# Patient Record
Sex: MALE | Race: WHITE | NOT HISPANIC OR LATINO | Employment: OTHER | URBAN - NONMETROPOLITAN AREA
[De-identification: names, ages, dates, MRNs, and addresses within clinical notes are randomized per-mention and may not be internally consistent; named-entity substitution may affect disease eponyms.]

---

## 2017-01-23 ENCOUNTER — LAB (OUTPATIENT)
Dept: LAB | Facility: OTHER | Age: 72
End: 2017-01-23

## 2017-01-23 ENCOUNTER — TRANSCRIBE ORDERS (OUTPATIENT)
Dept: LAB | Facility: OTHER | Age: 72
End: 2017-01-23

## 2017-01-23 DIAGNOSIS — Z79.01 LONG TERM (CURRENT) USE OF ANTICOAGULANTS: Primary | ICD-10-CM

## 2017-01-23 DIAGNOSIS — Z79.01 LONG TERM (CURRENT) USE OF ANTICOAGULANTS: ICD-10-CM

## 2017-01-23 LAB
INR PPP: 1.23 (ref 0.8–1.2)
PROTHROMBIN TIME: 15.2 SECONDS (ref 11.1–15.3)

## 2017-01-23 PROCEDURE — 36415 COLL VENOUS BLD VENIPUNCTURE: CPT | Performed by: INTERNAL MEDICINE

## 2017-01-23 PROCEDURE — 85610 PROTHROMBIN TIME: CPT | Performed by: INTERNAL MEDICINE

## 2017-03-24 ENCOUNTER — LAB (OUTPATIENT)
Dept: LAB | Facility: OTHER | Age: 72
End: 2017-03-24

## 2017-03-24 DIAGNOSIS — Z79.01 LONG TERM (CURRENT) USE OF ANTICOAGULANTS: ICD-10-CM

## 2017-03-24 LAB
INR PPP: 2.65 (ref 0.8–1.2)
PROTHROMBIN TIME: 27 SECONDS (ref 11.1–15.3)

## 2017-03-24 PROCEDURE — 36415 COLL VENOUS BLD VENIPUNCTURE: CPT | Performed by: INTERNAL MEDICINE

## 2017-03-24 PROCEDURE — 85610 PROTHROMBIN TIME: CPT | Performed by: INTERNAL MEDICINE

## 2017-07-13 ENCOUNTER — LAB (OUTPATIENT)
Dept: LAB | Facility: OTHER | Age: 72
End: 2017-07-13

## 2017-07-13 DIAGNOSIS — Z79.01 LONG TERM (CURRENT) USE OF ANTICOAGULANTS: ICD-10-CM

## 2017-07-13 LAB
INR PPP: 2.49 (ref 0.8–1.2)
PROTHROMBIN TIME: 27.2 SECONDS (ref 11.1–15.3)

## 2017-07-13 PROCEDURE — 85610 PROTHROMBIN TIME: CPT | Performed by: INTERNAL MEDICINE

## 2017-07-13 PROCEDURE — 36415 COLL VENOUS BLD VENIPUNCTURE: CPT | Performed by: INTERNAL MEDICINE

## 2017-11-21 ENCOUNTER — TRANSCRIBE ORDERS (OUTPATIENT)
Dept: GENERAL RADIOLOGY | Facility: CLINIC | Age: 72
End: 2017-11-21

## 2017-11-21 ENCOUNTER — LAB (OUTPATIENT)
Dept: LAB | Facility: OTHER | Age: 72
End: 2017-11-21

## 2017-11-21 DIAGNOSIS — Z79.01 LONG-TERM (CURRENT) USE OF ANTICOAGULANTS: ICD-10-CM

## 2017-11-21 DIAGNOSIS — Z79.01 LONG TERM CURRENT USE OF ANTICOAGULANT THERAPY: ICD-10-CM

## 2017-11-21 DIAGNOSIS — R97.20 ELEVATED PROSTATE SPECIFIC ANTIGEN (PSA): ICD-10-CM

## 2017-11-21 DIAGNOSIS — G89.4 CHRONIC PAIN SYNDROME: ICD-10-CM

## 2017-11-21 DIAGNOSIS — F17.200 SMOKING ADDICTION: ICD-10-CM

## 2017-11-21 DIAGNOSIS — R73.02 IGT (IMPAIRED GLUCOSE TOLERANCE): ICD-10-CM

## 2017-11-21 DIAGNOSIS — I10 ESSENTIAL HYPERTENSION: ICD-10-CM

## 2017-11-21 DIAGNOSIS — I10 ESSENTIAL HYPERTENSION: Primary | ICD-10-CM

## 2017-11-21 LAB
ALBUMIN SERPL-MCNC: 3.6 G/DL (ref 3.2–5.5)
ALBUMIN/GLOB SERPL: 1.2 G/DL (ref 1–3)
ALP SERPL-CCNC: 40 U/L (ref 15–121)
ALT SERPL W P-5'-P-CCNC: 9 U/L (ref 10–60)
ANION GAP SERPL CALCULATED.3IONS-SCNC: 8 MMOL/L (ref 5–15)
AST SERPL-CCNC: 12 U/L (ref 10–60)
BASOPHILS # BLD AUTO: 0.01 10*3/MM3 (ref 0–0.2)
BASOPHILS NFR BLD AUTO: 0.2 % (ref 0–2)
BILIRUB SERPL-MCNC: 1.3 MG/DL (ref 0.2–1)
BUN BLD-MCNC: 18 MG/DL (ref 8–25)
BUN/CREAT SERPL: 18 (ref 7–25)
CALCIUM SPEC-SCNC: 8.8 MG/DL (ref 8.4–10.8)
CHLORIDE SERPL-SCNC: 103 MMOL/L (ref 100–112)
CO2 SERPL-SCNC: 31 MMOL/L (ref 20–32)
CREAT BLD-MCNC: 1 MG/DL (ref 0.4–1.3)
DEPRECATED RDW RBC AUTO: 62.8 FL (ref 35.1–43.9)
EOSINOPHIL # BLD AUTO: 0.11 10*3/MM3 (ref 0–0.7)
EOSINOPHIL NFR BLD AUTO: 1.9 % (ref 0–7)
ERYTHROCYTE [DISTWIDTH] IN BLOOD BY AUTOMATED COUNT: 15.8 % (ref 11.5–14.5)
GFR SERPL CREATININE-BSD FRML MDRD: 74 ML/MIN/1.73 (ref 42–98)
GLOBULIN UR ELPH-MCNC: 3.1 GM/DL (ref 2.5–4.6)
GLUCOSE BLD-MCNC: 95 MG/DL (ref 70–100)
HCT VFR BLD AUTO: 39.2 % (ref 39–49)
HGB BLD-MCNC: 13.1 G/DL (ref 13.7–17.3)
INR PPP: 1.92 (ref 0.8–1.2)
LYMPHOCYTES # BLD AUTO: 1.2 10*3/MM3 (ref 0.6–4.2)
LYMPHOCYTES NFR BLD AUTO: 20.3 % (ref 10–50)
MCH RBC QN AUTO: 36.7 PG (ref 26.5–34)
MCHC RBC AUTO-ENTMCNC: 33.4 G/DL (ref 31.5–36.3)
MCV RBC AUTO: 109.8 FL (ref 80–98)
MONOCYTES # BLD AUTO: 0.61 10*3/MM3 (ref 0–0.9)
MONOCYTES NFR BLD AUTO: 10.3 % (ref 0–12)
NEUTROPHILS # BLD AUTO: 3.99 10*3/MM3 (ref 2–8.6)
NEUTROPHILS NFR BLD AUTO: 67.3 % (ref 37–80)
PLATELET # BLD AUTO: 120 10*3/MM3 (ref 150–450)
PMV BLD AUTO: 11 FL (ref 8–12)
POTASSIUM BLD-SCNC: 4.1 MMOL/L (ref 3.4–5.4)
PROT SERPL-MCNC: 6.7 G/DL (ref 6.7–8.2)
PROTHROMBIN TIME: 22 SECONDS (ref 11.1–15.3)
RBC # BLD AUTO: 3.57 10*6/MM3 (ref 4.37–5.74)
SODIUM BLD-SCNC: 142 MMOL/L (ref 134–146)
WBC NRBC COR # BLD: 5.92 10*3/MM3 (ref 3.2–9.8)

## 2017-11-21 PROCEDURE — 85025 COMPLETE CBC W/AUTO DIFF WBC: CPT | Performed by: INTERNAL MEDICINE

## 2017-11-21 PROCEDURE — 85610 PROTHROMBIN TIME: CPT | Performed by: INTERNAL MEDICINE

## 2017-11-21 PROCEDURE — 36415 COLL VENOUS BLD VENIPUNCTURE: CPT | Performed by: INTERNAL MEDICINE

## 2017-11-21 PROCEDURE — 80053 COMPREHEN METABOLIC PANEL: CPT | Performed by: INTERNAL MEDICINE

## 2017-11-30 ENCOUNTER — LAB (OUTPATIENT)
Dept: LAB | Facility: OTHER | Age: 72
End: 2017-11-30

## 2017-11-30 DIAGNOSIS — R97.20 ELEVATED PROSTATE SPECIFIC ANTIGEN (PSA): ICD-10-CM

## 2017-11-30 DIAGNOSIS — F17.200 SMOKING ADDICTION: ICD-10-CM

## 2017-11-30 DIAGNOSIS — R73.02 IGT (IMPAIRED GLUCOSE TOLERANCE): ICD-10-CM

## 2017-11-30 DIAGNOSIS — Z79.01 LONG-TERM (CURRENT) USE OF ANTICOAGULANTS: ICD-10-CM

## 2017-11-30 DIAGNOSIS — G89.4 CHRONIC PAIN SYNDROME: ICD-10-CM

## 2017-11-30 DIAGNOSIS — I10 ESSENTIAL HYPERTENSION: ICD-10-CM

## 2017-11-30 LAB
INR PPP: 1.33 (ref 0.8–1.2)
PROTHROMBIN TIME: 16.5 SECONDS (ref 11.1–15.3)

## 2017-11-30 PROCEDURE — 85610 PROTHROMBIN TIME: CPT | Performed by: INTERNAL MEDICINE

## 2017-11-30 PROCEDURE — 36415 COLL VENOUS BLD VENIPUNCTURE: CPT | Performed by: INTERNAL MEDICINE

## 2017-12-15 ENCOUNTER — LAB (OUTPATIENT)
Dept: LAB | Facility: OTHER | Age: 72
End: 2017-12-15

## 2017-12-15 DIAGNOSIS — I10 ESSENTIAL HYPERTENSION: ICD-10-CM

## 2017-12-15 DIAGNOSIS — Z79.01 LONG-TERM (CURRENT) USE OF ANTICOAGULANTS: ICD-10-CM

## 2017-12-15 DIAGNOSIS — G89.4 CHRONIC PAIN SYNDROME: ICD-10-CM

## 2017-12-15 DIAGNOSIS — R73.02 IGT (IMPAIRED GLUCOSE TOLERANCE): ICD-10-CM

## 2017-12-15 DIAGNOSIS — R97.20 ELEVATED PROSTATE SPECIFIC ANTIGEN (PSA): ICD-10-CM

## 2017-12-15 DIAGNOSIS — F17.200 SMOKING ADDICTION: ICD-10-CM

## 2017-12-15 LAB
INR PPP: 1.28 (ref 0.8–1.2)
PROTHROMBIN TIME: 16 SECONDS (ref 11.1–15.3)

## 2017-12-15 PROCEDURE — 36415 COLL VENOUS BLD VENIPUNCTURE: CPT | Performed by: INTERNAL MEDICINE

## 2017-12-15 PROCEDURE — 85610 PROTHROMBIN TIME: CPT | Performed by: INTERNAL MEDICINE

## 2017-12-21 ENCOUNTER — LAB (OUTPATIENT)
Dept: LAB | Facility: OTHER | Age: 72
End: 2017-12-21

## 2017-12-21 DIAGNOSIS — I10 ESSENTIAL HYPERTENSION: ICD-10-CM

## 2017-12-21 DIAGNOSIS — Z79.01 LONG-TERM (CURRENT) USE OF ANTICOAGULANTS: ICD-10-CM

## 2017-12-21 DIAGNOSIS — R97.20 ELEVATED PROSTATE SPECIFIC ANTIGEN (PSA): ICD-10-CM

## 2017-12-21 DIAGNOSIS — F17.200 SMOKING ADDICTION: ICD-10-CM

## 2017-12-21 DIAGNOSIS — G89.4 CHRONIC PAIN SYNDROME: ICD-10-CM

## 2017-12-21 DIAGNOSIS — R73.02 IGT (IMPAIRED GLUCOSE TOLERANCE): ICD-10-CM

## 2017-12-21 LAB
INR PPP: 1.58 (ref 0.8–1.2)
PROTHROMBIN TIME: 18.9 SECONDS (ref 11.1–15.3)

## 2017-12-21 PROCEDURE — 36415 COLL VENOUS BLD VENIPUNCTURE: CPT | Performed by: INTERNAL MEDICINE

## 2017-12-21 PROCEDURE — 85610 PROTHROMBIN TIME: CPT | Performed by: INTERNAL MEDICINE

## 2017-12-28 ENCOUNTER — LAB (OUTPATIENT)
Dept: LAB | Facility: OTHER | Age: 72
End: 2017-12-28

## 2017-12-28 DIAGNOSIS — G89.4 CHRONIC PAIN SYNDROME: ICD-10-CM

## 2017-12-28 DIAGNOSIS — R97.20 ELEVATED PROSTATE SPECIFIC ANTIGEN (PSA): ICD-10-CM

## 2017-12-28 DIAGNOSIS — F17.200 SMOKING ADDICTION: ICD-10-CM

## 2017-12-28 DIAGNOSIS — R73.02 IGT (IMPAIRED GLUCOSE TOLERANCE): ICD-10-CM

## 2017-12-28 DIAGNOSIS — Z79.01 LONG-TERM (CURRENT) USE OF ANTICOAGULANTS: ICD-10-CM

## 2017-12-28 DIAGNOSIS — I10 ESSENTIAL HYPERTENSION: ICD-10-CM

## 2017-12-28 LAB
INR PPP: 2.44 (ref 0.8–1.2)
PROTHROMBIN TIME: 26.7 SECONDS (ref 11.1–15.3)

## 2017-12-28 PROCEDURE — 85610 PROTHROMBIN TIME: CPT | Performed by: INTERNAL MEDICINE

## 2017-12-28 PROCEDURE — 36415 COLL VENOUS BLD VENIPUNCTURE: CPT | Performed by: INTERNAL MEDICINE

## 2018-01-04 ENCOUNTER — LAB (OUTPATIENT)
Dept: LAB | Facility: OTHER | Age: 73
End: 2018-01-04

## 2018-01-04 DIAGNOSIS — Z79.01 LONG-TERM (CURRENT) USE OF ANTICOAGULANTS: ICD-10-CM

## 2018-01-04 DIAGNOSIS — F17.200 SMOKING ADDICTION: ICD-10-CM

## 2018-01-04 DIAGNOSIS — G89.4 CHRONIC PAIN SYNDROME: ICD-10-CM

## 2018-01-04 DIAGNOSIS — R73.02 IGT (IMPAIRED GLUCOSE TOLERANCE): ICD-10-CM

## 2018-01-04 DIAGNOSIS — I10 ESSENTIAL HYPERTENSION: ICD-10-CM

## 2018-01-04 DIAGNOSIS — R97.20 ELEVATED PROSTATE SPECIFIC ANTIGEN (PSA): ICD-10-CM

## 2018-01-04 LAB
INR PPP: 1.71 (ref 0.8–1.2)
PROTHROMBIN TIME: 20.2 SECONDS (ref 11.1–15.3)

## 2018-01-04 PROCEDURE — 36415 COLL VENOUS BLD VENIPUNCTURE: CPT | Performed by: INTERNAL MEDICINE

## 2018-01-04 PROCEDURE — 85610 PROTHROMBIN TIME: CPT | Performed by: INTERNAL MEDICINE

## 2018-01-17 ENCOUNTER — LAB (OUTPATIENT)
Dept: LAB | Facility: OTHER | Age: 73
End: 2018-01-17

## 2018-01-17 DIAGNOSIS — Z79.01 LONG-TERM (CURRENT) USE OF ANTICOAGULANTS: ICD-10-CM

## 2018-01-17 DIAGNOSIS — F17.200 SMOKING ADDICTION: ICD-10-CM

## 2018-01-17 DIAGNOSIS — G89.4 CHRONIC PAIN SYNDROME: ICD-10-CM

## 2018-01-17 DIAGNOSIS — R97.20 ELEVATED PROSTATE SPECIFIC ANTIGEN (PSA): ICD-10-CM

## 2018-01-17 DIAGNOSIS — R73.02 IGT (IMPAIRED GLUCOSE TOLERANCE): ICD-10-CM

## 2018-01-17 DIAGNOSIS — I10 ESSENTIAL HYPERTENSION: ICD-10-CM

## 2018-01-17 LAB
INR PPP: 2.1 (ref 0.8–1.2)
PROTHROMBIN TIME: 23.7 SECONDS (ref 11.1–15.3)

## 2018-01-17 PROCEDURE — 36415 COLL VENOUS BLD VENIPUNCTURE: CPT | Performed by: INTERNAL MEDICINE

## 2018-01-17 PROCEDURE — 85610 PROTHROMBIN TIME: CPT | Performed by: INTERNAL MEDICINE

## 2018-03-07 ENCOUNTER — LAB (OUTPATIENT)
Dept: LAB | Facility: OTHER | Age: 73
End: 2018-03-07

## 2018-03-07 DIAGNOSIS — Z79.01 LONG-TERM (CURRENT) USE OF ANTICOAGULANTS: ICD-10-CM

## 2018-03-07 DIAGNOSIS — R97.20 ELEVATED PROSTATE SPECIFIC ANTIGEN (PSA): ICD-10-CM

## 2018-03-07 DIAGNOSIS — G89.4 CHRONIC PAIN SYNDROME: ICD-10-CM

## 2018-03-07 DIAGNOSIS — F17.200 SMOKING ADDICTION: ICD-10-CM

## 2018-03-07 DIAGNOSIS — I10 ESSENTIAL HYPERTENSION: ICD-10-CM

## 2018-03-07 DIAGNOSIS — R73.02 IGT (IMPAIRED GLUCOSE TOLERANCE): ICD-10-CM

## 2018-03-07 LAB
INR PPP: 1.85 (ref 0.8–1.2)
PROTHROMBIN TIME: 21.4 SECONDS (ref 11.1–15.3)

## 2018-03-07 PROCEDURE — 85610 PROTHROMBIN TIME: CPT | Performed by: INTERNAL MEDICINE

## 2018-03-07 PROCEDURE — 36415 COLL VENOUS BLD VENIPUNCTURE: CPT | Performed by: INTERNAL MEDICINE

## 2018-03-27 ENCOUNTER — LAB (OUTPATIENT)
Dept: LAB | Facility: OTHER | Age: 73
End: 2018-03-27

## 2018-03-27 DIAGNOSIS — Z79.01 LONG-TERM (CURRENT) USE OF ANTICOAGULANTS: ICD-10-CM

## 2018-03-27 DIAGNOSIS — F17.200 SMOKING ADDICTION: ICD-10-CM

## 2018-03-27 DIAGNOSIS — I10 ESSENTIAL HYPERTENSION: ICD-10-CM

## 2018-03-27 DIAGNOSIS — R97.20 ELEVATED PROSTATE SPECIFIC ANTIGEN (PSA): ICD-10-CM

## 2018-03-27 DIAGNOSIS — R73.02 IGT (IMPAIRED GLUCOSE TOLERANCE): ICD-10-CM

## 2018-03-27 DIAGNOSIS — G89.4 CHRONIC PAIN SYNDROME: ICD-10-CM

## 2018-03-27 LAB
INR PPP: 6.31 (ref 0.8–1.2)
PROTHROMBIN TIME: 57.2 SECONDS (ref 11.1–15.3)

## 2018-03-27 PROCEDURE — 85610 PROTHROMBIN TIME: CPT | Performed by: INTERNAL MEDICINE

## 2018-04-09 ENCOUNTER — LAB (OUTPATIENT)
Dept: LAB | Facility: OTHER | Age: 73
End: 2018-04-09

## 2018-04-09 DIAGNOSIS — R73.02 IGT (IMPAIRED GLUCOSE TOLERANCE): ICD-10-CM

## 2018-04-09 DIAGNOSIS — F17.200 SMOKING ADDICTION: ICD-10-CM

## 2018-04-09 DIAGNOSIS — Z79.01 LONG-TERM (CURRENT) USE OF ANTICOAGULANTS: ICD-10-CM

## 2018-04-09 DIAGNOSIS — R97.20 ELEVATED PROSTATE SPECIFIC ANTIGEN (PSA): ICD-10-CM

## 2018-04-09 DIAGNOSIS — I10 ESSENTIAL HYPERTENSION: ICD-10-CM

## 2018-04-09 DIAGNOSIS — G89.4 CHRONIC PAIN SYNDROME: ICD-10-CM

## 2018-04-09 LAB
INR PPP: 1.88 (ref 0.8–1.2)
PROTHROMBIN TIME: 21.7 SECONDS (ref 11.1–15.3)

## 2018-04-09 PROCEDURE — 85610 PROTHROMBIN TIME: CPT | Performed by: INTERNAL MEDICINE

## 2018-04-09 PROCEDURE — 36415 COLL VENOUS BLD VENIPUNCTURE: CPT | Performed by: INTERNAL MEDICINE

## 2018-05-21 ENCOUNTER — LAB (OUTPATIENT)
Dept: LAB | Facility: OTHER | Age: 73
End: 2018-05-21

## 2018-05-21 ENCOUNTER — TRANSCRIBE ORDERS (OUTPATIENT)
Dept: GENERAL RADIOLOGY | Facility: CLINIC | Age: 73
End: 2018-05-21

## 2018-05-21 DIAGNOSIS — R97.20 ELEVATED PROSTATE SPECIFIC ANTIGEN (PSA): ICD-10-CM

## 2018-05-21 DIAGNOSIS — R73.02 IGT (IMPAIRED GLUCOSE TOLERANCE): ICD-10-CM

## 2018-05-21 DIAGNOSIS — I10 ESSENTIAL HYPERTENSION, MALIGNANT: ICD-10-CM

## 2018-05-21 DIAGNOSIS — Z79.01 LONG-TERM (CURRENT) USE OF ANTICOAGULANTS: ICD-10-CM

## 2018-05-21 DIAGNOSIS — R73.09 IMPAIRED GLUCOSE TOLERANCE TEST: ICD-10-CM

## 2018-05-21 DIAGNOSIS — G89.4 CHRONIC PAIN SYNDROME: ICD-10-CM

## 2018-05-21 DIAGNOSIS — J34.89 NASAL LESION: ICD-10-CM

## 2018-05-21 DIAGNOSIS — F17.200 SMOKING ADDICTION: ICD-10-CM

## 2018-05-21 DIAGNOSIS — G89.4 CHRONIC PAIN SYNDROME: Primary | ICD-10-CM

## 2018-05-21 DIAGNOSIS — I10 ESSENTIAL HYPERTENSION: ICD-10-CM

## 2018-05-21 LAB
ALBUMIN SERPL-MCNC: 3.8 G/DL (ref 3.2–5.5)
ALBUMIN/GLOB SERPL: 1.2 G/DL (ref 1–3)
ALP SERPL-CCNC: 45 U/L (ref 15–121)
ALT SERPL W P-5'-P-CCNC: 14 U/L (ref 10–60)
ANION GAP SERPL CALCULATED.3IONS-SCNC: 9 MMOL/L (ref 5–15)
ANISOCYTOSIS BLD QL: NORMAL
AST SERPL-CCNC: 20 U/L (ref 10–60)
BILIRUB SERPL-MCNC: 0.7 MG/DL (ref 0.2–1)
BUN BLD-MCNC: 17 MG/DL (ref 8–25)
BUN/CREAT SERPL: 17 (ref 7–25)
CALCIUM SPEC-SCNC: 8.8 MG/DL (ref 8.4–10.8)
CHLORIDE SERPL-SCNC: 102 MMOL/L (ref 100–112)
CHOLEST SERPL-MCNC: 180 MG/DL (ref 150–200)
CO2 SERPL-SCNC: 29 MMOL/L (ref 20–32)
CREAT BLD-MCNC: 1 MG/DL (ref 0.4–1.3)
DEPRECATED RDW RBC AUTO: 64.1 FL (ref 35.1–43.9)
EOSINOPHIL # BLD MANUAL: 0.07 10*3/MM3 (ref 0–0.7)
EOSINOPHIL NFR BLD MANUAL: 1 % (ref 0–7)
ERYTHROCYTE [DISTWIDTH] IN BLOOD BY AUTOMATED COUNT: 16 % (ref 11.5–14.5)
GFR SERPL CREATININE-BSD FRML MDRD: 73 ML/MIN/1.73 (ref 42–98)
GLOBULIN UR ELPH-MCNC: 3.3 GM/DL (ref 2.5–4.6)
GLUCOSE BLD-MCNC: 128 MG/DL (ref 70–100)
HBA1C MFR BLD: 6.1 % (ref 4–5.6)
HCT VFR BLD AUTO: 45.7 % (ref 39–49)
HDLC SERPL-MCNC: 59 MG/DL (ref 35–100)
HGB BLD-MCNC: 14.8 G/DL (ref 13.7–17.3)
INR PPP: 4.1 (ref 0.8–1.2)
LDLC SERPL CALC-MCNC: 98 MG/DL
LDLC/HDLC SERPL: 1.66 {RATIO}
LYMPHOCYTES # BLD MANUAL: 1.62 10*3/MM3 (ref 0.6–4.2)
LYMPHOCYTES NFR BLD MANUAL: 10 % (ref 0–12)
LYMPHOCYTES NFR BLD MANUAL: 22 % (ref 10–50)
MACROCYTES BLD QL SMEAR: NORMAL
MCH RBC QN AUTO: 35.8 PG (ref 26.5–34)
MCHC RBC AUTO-ENTMCNC: 32.4 G/DL (ref 31.5–36.3)
MCV RBC AUTO: 110.7 FL (ref 80–98)
MONOCYTES # BLD AUTO: 0.74 10*3/MM3 (ref 0–0.9)
NEUTROPHILS # BLD AUTO: 4.94 10*3/MM3 (ref 2–8.6)
NEUTROPHILS NFR BLD MANUAL: 65 % (ref 37–80)
NEUTS BAND NFR BLD MANUAL: 2 % (ref 0–5)
PLATELET # BLD AUTO: 158 10*3/MM3 (ref 150–450)
PMV BLD AUTO: 10.7 FL (ref 8–12)
POTASSIUM BLD-SCNC: 4.5 MMOL/L (ref 3.4–5.4)
PROT SERPL-MCNC: 7.1 G/DL (ref 6.7–8.2)
PROTHROMBIN TIME: 40.5 SECONDS (ref 11.1–15.3)
PSA SERPL-MCNC: 0.35 NG/ML (ref 0–4)
RBC # BLD AUTO: 4.13 10*6/MM3 (ref 4.37–5.74)
SCAN SLIDE: NORMAL
SMALL PLATELETS BLD QL SMEAR: ADEQUATE
SODIUM BLD-SCNC: 140 MMOL/L (ref 134–146)
T4 FREE SERPL-MCNC: 0.88 NG/DL (ref 0.78–2.19)
TRIGL SERPL-MCNC: 115 MG/DL (ref 35–160)
TSH SERPL DL<=0.05 MIU/L-ACNC: 0.81 MIU/ML (ref 0.46–4.68)
VLDLC SERPL-MCNC: 23 MG/DL
WBC MORPH BLD: NORMAL
WBC NRBC COR # BLD: 7.38 10*3/MM3 (ref 3.2–9.8)

## 2018-05-21 PROCEDURE — 85610 PROTHROMBIN TIME: CPT | Performed by: INTERNAL MEDICINE

## 2018-05-21 PROCEDURE — 85025 COMPLETE CBC W/AUTO DIFF WBC: CPT | Performed by: INTERNAL MEDICINE

## 2018-05-21 PROCEDURE — 83036 HEMOGLOBIN GLYCOSYLATED A1C: CPT | Performed by: INTERNAL MEDICINE

## 2018-05-21 PROCEDURE — 36415 COLL VENOUS BLD VENIPUNCTURE: CPT | Performed by: INTERNAL MEDICINE

## 2018-05-21 PROCEDURE — 84443 ASSAY THYROID STIM HORMONE: CPT | Performed by: INTERNAL MEDICINE

## 2018-05-21 PROCEDURE — 80061 LIPID PANEL: CPT | Performed by: INTERNAL MEDICINE

## 2018-05-21 PROCEDURE — 84439 ASSAY OF FREE THYROXINE: CPT | Performed by: INTERNAL MEDICINE

## 2018-05-21 PROCEDURE — G0103 PSA SCREENING: HCPCS | Performed by: INTERNAL MEDICINE

## 2018-05-21 PROCEDURE — 80053 COMPREHEN METABOLIC PANEL: CPT | Performed by: INTERNAL MEDICINE

## 2018-07-11 ENCOUNTER — LAB (OUTPATIENT)
Dept: LAB | Facility: OTHER | Age: 73
End: 2018-07-11

## 2018-07-11 DIAGNOSIS — F17.200 SMOKING ADDICTION: ICD-10-CM

## 2018-07-11 DIAGNOSIS — R73.02 IGT (IMPAIRED GLUCOSE TOLERANCE): ICD-10-CM

## 2018-07-11 DIAGNOSIS — Z79.01 LONG-TERM (CURRENT) USE OF ANTICOAGULANTS: ICD-10-CM

## 2018-07-11 DIAGNOSIS — G89.4 CHRONIC PAIN SYNDROME: ICD-10-CM

## 2018-07-11 DIAGNOSIS — I10 ESSENTIAL HYPERTENSION: ICD-10-CM

## 2018-07-11 DIAGNOSIS — R97.20 ELEVATED PROSTATE SPECIFIC ANTIGEN (PSA): ICD-10-CM

## 2018-07-11 LAB
INR PPP: 1.8 (ref 0.8–1.2)
PROTHROMBIN TIME: 20.2 SECONDS (ref 11.1–15.3)

## 2018-07-11 PROCEDURE — 85610 PROTHROMBIN TIME: CPT | Performed by: INTERNAL MEDICINE

## 2018-07-11 PROCEDURE — 36415 COLL VENOUS BLD VENIPUNCTURE: CPT | Performed by: INTERNAL MEDICINE

## 2018-09-18 ENCOUNTER — LAB (OUTPATIENT)
Dept: LAB | Facility: OTHER | Age: 73
End: 2018-09-18

## 2018-09-18 ENCOUNTER — OFFICE VISIT (OUTPATIENT)
Dept: FAMILY MEDICINE CLINIC | Facility: CLINIC | Age: 73
End: 2018-09-18

## 2018-09-18 VITALS
WEIGHT: 250 LBS | BODY MASS INDEX: 33.13 KG/M2 | DIASTOLIC BLOOD PRESSURE: 70 MMHG | HEIGHT: 73 IN | TEMPERATURE: 96.7 F | OXYGEN SATURATION: 95 % | RESPIRATION RATE: 14 BRPM | SYSTOLIC BLOOD PRESSURE: 110 MMHG | HEART RATE: 98 BPM

## 2018-09-18 DIAGNOSIS — J44.9 COPD (CHRONIC OBSTRUCTIVE PULMONARY DISEASE) WITH CHRONIC BRONCHITIS (HCC): ICD-10-CM

## 2018-09-18 DIAGNOSIS — Z12.5 PROSTATE CANCER SCREENING: ICD-10-CM

## 2018-09-18 DIAGNOSIS — F17.200 SMOKING ADDICTION: ICD-10-CM

## 2018-09-18 DIAGNOSIS — I10 ESSENTIAL HYPERTENSION: ICD-10-CM

## 2018-09-18 DIAGNOSIS — G89.4 CHRONIC PAIN DISORDER: ICD-10-CM

## 2018-09-18 DIAGNOSIS — Z79.01 ENCOUNTER FOR CURRENT LONG-TERM USE OF ANTICOAGULANTS: Primary | ICD-10-CM

## 2018-09-18 DIAGNOSIS — Z87.898 HISTORY OF PREDIABETES: ICD-10-CM

## 2018-09-18 DIAGNOSIS — R97.20 ELEVATED PROSTATE SPECIFIC ANTIGEN (PSA): ICD-10-CM

## 2018-09-18 DIAGNOSIS — G89.29 CHRONIC LEFT SHOULDER PAIN: ICD-10-CM

## 2018-09-18 DIAGNOSIS — E78.2 MIXED HYPERLIPIDEMIA: ICD-10-CM

## 2018-09-18 DIAGNOSIS — G89.29 CHRONIC BILATERAL LOW BACK PAIN WITHOUT SCIATICA: ICD-10-CM

## 2018-09-18 DIAGNOSIS — Z79.899 HIGH RISK MEDICATIONS (NOT ANTICOAGULANTS) LONG-TERM USE: ICD-10-CM

## 2018-09-18 DIAGNOSIS — Z79.83 LONG TERM USE OF BISPHOSPHONATES: ICD-10-CM

## 2018-09-18 DIAGNOSIS — R79.89 ABNORMAL CBC: ICD-10-CM

## 2018-09-18 DIAGNOSIS — I48.20 ATRIAL FIBRILLATION, CHRONIC (HCC): ICD-10-CM

## 2018-09-18 DIAGNOSIS — G63 POLYNEUROPATHY ASSOCIATED WITH UNDERLYING DISEASE (HCC): ICD-10-CM

## 2018-09-18 DIAGNOSIS — R73.02 IGT (IMPAIRED GLUCOSE TOLERANCE): ICD-10-CM

## 2018-09-18 DIAGNOSIS — G89.4 CHRONIC PAIN SYNDROME: ICD-10-CM

## 2018-09-18 DIAGNOSIS — M54.50 CHRONIC BILATERAL LOW BACK PAIN WITHOUT SCIATICA: ICD-10-CM

## 2018-09-18 DIAGNOSIS — M25.512 CHRONIC LEFT SHOULDER PAIN: ICD-10-CM

## 2018-09-18 DIAGNOSIS — Z79.01 LONG TERM CURRENT USE OF ANTICOAGULANT THERAPY: ICD-10-CM

## 2018-09-18 DIAGNOSIS — Z79.01 LONG-TERM (CURRENT) USE OF ANTICOAGULANTS: ICD-10-CM

## 2018-09-18 PROBLEM — J34.89 NASAL LESION: Status: ACTIVE | Noted: 2018-04-26

## 2018-09-18 PROBLEM — S09.90XA HEAD INJURY: Status: ACTIVE | Noted: 2017-10-24

## 2018-09-18 PROBLEM — S09.90XA HEAD INJURY: Status: RESOLVED | Noted: 2017-10-24 | Resolved: 2018-09-18

## 2018-09-18 PROBLEM — J34.89 NASAL LESION: Status: RESOLVED | Noted: 2018-04-26 | Resolved: 2018-09-18

## 2018-09-18 LAB
ALBUMIN SERPL-MCNC: 4 G/DL (ref 3.5–5)
ALBUMIN/GLOB SERPL: 1.1 G/DL (ref 1.1–1.8)
ALP SERPL-CCNC: 59 U/L (ref 38–126)
ALT SERPL W P-5'-P-CCNC: 12 U/L
ANION GAP SERPL CALCULATED.3IONS-SCNC: 7 MMOL/L (ref 5–15)
AST SERPL-CCNC: 15 U/L (ref 17–59)
BILIRUB SERPL-MCNC: 0.9 MG/DL (ref 0.2–1.3)
BUN BLD-MCNC: 22 MG/DL (ref 9–20)
BUN/CREAT SERPL: 17.5 (ref 7–25)
CALCIUM SPEC-SCNC: 9.4 MG/DL (ref 8.4–10.2)
CHLORIDE SERPL-SCNC: 104 MMOL/L (ref 98–107)
CHOLEST SERPL-MCNC: 189 MG/DL (ref 150–200)
CO2 SERPL-SCNC: 28 MMOL/L (ref 22–30)
CREAT BLD-MCNC: 1.26 MG/DL (ref 0.66–1.25)
DEPRECATED RDW RBC AUTO: 62.9 FL (ref 35.1–43.9)
DIGOXIN SERPL-MCNC: 0.4 NG/ML (ref 0.8–2)
EOSINOPHIL # BLD MANUAL: 0.15 10*3/MM3 (ref 0–0.7)
EOSINOPHIL NFR BLD MANUAL: 2 % (ref 0–7)
ERYTHROCYTE [DISTWIDTH] IN BLOOD BY AUTOMATED COUNT: 16 % (ref 11.5–14.5)
GFR SERPL CREATININE-BSD FRML MDRD: 56 ML/MIN/1.73 (ref 42–98)
GLOBULIN UR ELPH-MCNC: 3.7 GM/DL (ref 2.3–3.5)
GLUCOSE BLD-MCNC: 150 MG/DL (ref 74–99)
HBA1C MFR BLD: 6 % (ref 4–5.6)
HCT VFR BLD AUTO: 50.8 % (ref 39–49)
HDLC SERPL-MCNC: 64 MG/DL (ref 40–59)
HGB BLD-MCNC: 16.9 G/DL (ref 13.7–17.3)
INR PPP: 2.24 (ref 0.8–1.2)
LDLC SERPL CALC-MCNC: 105 MG/DL
LDLC/HDLC SERPL: 1.63 {RATIO} (ref 0–3.55)
LYMPHOCYTES # BLD MANUAL: 1.63 10*3/MM3 (ref 0.6–4.2)
LYMPHOCYTES NFR BLD MANUAL: 22 % (ref 10–50)
LYMPHOCYTES NFR BLD MANUAL: 5 % (ref 0–12)
MACROCYTES BLD QL SMEAR: NORMAL
MAGNESIUM SERPL-MCNC: 2 MG/DL (ref 1.6–2.3)
MCH RBC QN AUTO: 36 PG (ref 26.5–34)
MCHC RBC AUTO-ENTMCNC: 33.3 G/DL (ref 31.5–36.3)
MCV RBC AUTO: 108.3 FL (ref 80–98)
MONOCYTES # BLD AUTO: 0.37 10*3/MM3 (ref 0–0.9)
NEUTROPHILS # BLD AUTO: 5.05 10*3/MM3 (ref 2–8.6)
NEUTROPHILS NFR BLD MANUAL: 68 % (ref 37–80)
PLAT MORPH BLD: NORMAL
PLATELET # BLD AUTO: 176 10*3/MM3 (ref 150–450)
PMV BLD AUTO: 10.4 FL (ref 8–12)
POTASSIUM BLD-SCNC: 4.7 MMOL/L (ref 3.4–5)
PROT SERPL-MCNC: 7.7 G/DL (ref 6.3–8.2)
PROTHROMBIN TIME: 23.8 SECONDS (ref 11.1–15.3)
RBC # BLD AUTO: 4.69 10*6/MM3 (ref 4.37–5.74)
SCAN SLIDE: NORMAL
SODIUM BLD-SCNC: 139 MMOL/L (ref 137–145)
TRIGL SERPL-MCNC: 102 MG/DL
VARIANT LYMPHS NFR BLD MANUAL: 3 % (ref 0–5)
VLDLC SERPL-MCNC: 20.4 MG/DL
WBC MORPH BLD: NORMAL
WBC NRBC COR # BLD: 7.42 10*3/MM3 (ref 3.2–9.8)

## 2018-09-18 PROCEDURE — 85025 COMPLETE CBC W/AUTO DIFF WBC: CPT | Performed by: INTERNAL MEDICINE

## 2018-09-18 PROCEDURE — 80053 COMPREHEN METABOLIC PANEL: CPT | Performed by: INTERNAL MEDICINE

## 2018-09-18 PROCEDURE — 85610 PROTHROMBIN TIME: CPT | Performed by: INTERNAL MEDICINE

## 2018-09-18 PROCEDURE — 83036 HEMOGLOBIN GLYCOSYLATED A1C: CPT | Performed by: NURSE PRACTITIONER

## 2018-09-18 PROCEDURE — 99204 OFFICE O/P NEW MOD 45 MIN: CPT | Performed by: NURSE PRACTITIONER

## 2018-09-18 PROCEDURE — 80162 ASSAY OF DIGOXIN TOTAL: CPT | Performed by: NURSE PRACTITIONER

## 2018-09-18 PROCEDURE — 36415 COLL VENOUS BLD VENIPUNCTURE: CPT | Performed by: INTERNAL MEDICINE

## 2018-09-18 PROCEDURE — 83735 ASSAY OF MAGNESIUM: CPT | Performed by: INTERNAL MEDICINE

## 2018-09-18 PROCEDURE — 80061 LIPID PANEL: CPT | Performed by: INTERNAL MEDICINE

## 2018-09-18 RX ORDER — ENALAPRIL MALEATE 5 MG/1
TABLET ORAL
Qty: 90 TABLET | Refills: 1 | Status: SHIPPED | OUTPATIENT
Start: 2018-09-18 | End: 2020-01-09

## 2018-09-18 RX ORDER — WARFARIN SODIUM 5 MG/1
TABLET ORAL
COMMUNITY
Start: 2018-03-07 | End: 2020-01-09

## 2018-09-18 RX ORDER — ATORVASTATIN CALCIUM 40 MG/1
40 TABLET, FILM COATED ORAL
COMMUNITY
Start: 2018-05-31 | End: 2018-09-18 | Stop reason: SDUPTHER

## 2018-09-18 RX ORDER — ENALAPRIL MALEATE 5 MG/1
TABLET ORAL
COMMUNITY
Start: 2018-07-11 | End: 2018-09-18 | Stop reason: SDUPTHER

## 2018-09-18 RX ORDER — VENLAFAXINE 75 MG/1
75 TABLET ORAL
Qty: 90 TABLET | Refills: 1 | Status: SHIPPED | OUTPATIENT
Start: 2018-09-18 | End: 2020-01-09

## 2018-09-18 RX ORDER — METOPROLOL SUCCINATE 50 MG/1
50 TABLET, EXTENDED RELEASE ORAL DAILY
Qty: 90 TABLET | Refills: 1 | Status: SHIPPED | OUTPATIENT
Start: 2018-09-18 | End: 2020-01-09

## 2018-09-18 RX ORDER — DIGOXIN 125 MCG
125 TABLET ORAL DAILY
Qty: 90 TABLET | Refills: 1 | Status: SHIPPED | OUTPATIENT
Start: 2018-09-18 | End: 2020-01-09

## 2018-09-18 RX ORDER — MECLIZINE HYDROCHLORIDE 25 MG/1
TABLET ORAL
Refills: 5 | COMMUNITY
Start: 2018-09-13 | End: 2020-01-09

## 2018-09-18 RX ORDER — VENLAFAXINE 75 MG/1
TABLET ORAL
COMMUNITY
Start: 2018-03-07 | End: 2018-09-18 | Stop reason: SDUPTHER

## 2018-09-18 RX ORDER — METOPROLOL SUCCINATE 50 MG/1
TABLET, EXTENDED RELEASE ORAL
COMMUNITY
Start: 2018-06-20 | End: 2018-09-18 | Stop reason: SDUPTHER

## 2018-09-18 RX ORDER — DIGOXIN 125 MCG
TABLET ORAL
COMMUNITY
Start: 2018-06-11 | End: 2018-09-18 | Stop reason: SDUPTHER

## 2018-09-18 RX ORDER — WARFARIN SODIUM 1 MG/1
TABLET ORAL
COMMUNITY
Start: 2018-04-27 | End: 2019-04-28

## 2018-09-18 RX ORDER — ATORVASTATIN CALCIUM 40 MG/1
40 TABLET, FILM COATED ORAL DAILY
Qty: 90 TABLET | Refills: 1 | Status: SHIPPED | OUTPATIENT
Start: 2018-09-18 | End: 2020-01-09

## 2018-09-18 NOTE — PROGRESS NOTES
Subjective   Isamar Rodriguez is a 72 y.o. male.     Patient presents with multiple chronic problems to establish care. States has a long history of being on Lortab for neck and back pain but stopped them and feels better without them. He comes from the care of Dr Jeffery Bird.  He has chronic atrial fibrillation, onset approx 20 years ago. He has been seen by Dr Benoit in the past for his chronic atrial fibrillation, for which he takes warfarin. This was managed by Dr Bird previously, and he wants us to assume this for him as well. He presents with stable anxiety, managed on medicine. He has stable hypertension and is a prediabetic. 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 This also resulted in chronic lower back pain without radiation. He is a daily smoker, who is not interested in stopping at this time. He uses an albuterol inhaler rarely if needed for SOB.       Anxiety   Presents for initial visit. Onset was more than 5 years ago (2010 when his wife passed away). The problem has been gradually improving. Symptoms include nervous/anxious behavior (hx of anxiety states is controlled with meds satisfactorily). Patient reports no chest pain, compulsions, confusion, decreased concentration, depressed mood, dizziness, dry mouth, excessive worry, feeling of choking, hyperventilation, impotence, insomnia, irritability, malaise, muscle tension, nausea, obsessions, palpitations, panic, restlessness, shortness of breath or suicidal ideas. Symptoms occur rarely. The severity of symptoms is mild. The symptoms are aggravated by family issues (began with death of wife in 2010). The quality of sleep is good. Nighttime awakenings: occasional.     Risk factors include change in medication (recently went cold-turkey off lortab). His past medical history is significant for  anxiety/panic attacks, arrhythmia and chronic lung disease. There is no history of anemia, asthma, bipolar disorder, CAD, CHF, depression, fibromyalgia, hyperthyroidism or suicide attempts. Past treatments include non-SSRI antidepressants and lifestyle changes. The treatment provided significant relief. Compliance with prior treatments has been good. Past compliance problems: none.   Neck Pain    This is a chronic problem. The current episode started more than 1 year ago. The problem occurs intermittently. The problem has been resolved (generally the quality of pain and its imposition on his life has resolved to a limited issue). The pain is associated with a remote injury (mining injury in the 1980s involving head/neck injury). The pain is present in the midline. The quality of the pain is described as aching. The pain is at a severity of 2/10. The pain is mild. 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 The pain is same all the time. Stiffness is present in the morning. Pertinent negatives include no chest pain, fever, headaches, numbness, photophobia, trouble swallowing or weakness. Associated symptoms comments: None. There is inherent stiffness of the neck as expected post cervical fusion.. He has tried acetaminophen, heat, home exercises, ice, NSAIDs and oral narcotics (no longer on opioids for this or any other pain) for the symptoms. The treatment provided significant relief.   Hypertension   This is a chronic problem. The current episode started more than 1 year ago. The problem is unchanged. The problem is controlled. Associated symptoms include neck pain (mild). Pertinent negatives include no blurred vision, chest pain, headaches, malaise/fatigue, palpitations, peripheral edema, PND or shortness of breath. There are no associated agents to hypertension. Risk factors for coronary artery disease include diabetes mellitus,  dyslipidemia, male gender, obesity, sedentary lifestyle, smoking/tobacco exposure and stress. Past treatments include ACE inhibitors and beta blockers. Current antihypertension treatment includes ACE inhibitors and beta blockers. The current treatment provides significant improvement. There are no compliance problems.  Sleep apnea: has not been tested for sleep apnea. Thyroid problem: last thyroid screening was negative.   COPD   This is a chronic (pt reports doesnt know if he has ever had pulmonary function testing.) problem. The current episode started more than 1 year ago. The problem occurs rarely. The problem has been unchanged. Associated symptoms include arthralgias (lower extremities and shoulders at times), congestion (occassionally to rarely), coughing (occassionally to rarely, relieved with albuterol inhaler use.) and neck pain (mild). Pertinent negatives include no abdominal pain, chest pain, chills, diaphoresis, fatigue, fever, headaches, nausea, numbness, rash, sore throat, vomiting or weakness. Treatments tried: albuterol inhaler prn. The treatment provided significant relief.   Back Pain   This is a chronic problem. The current episode started more than 1 year ago. The problem occurs intermittently. The problem is unchanged. The pain is present in the lumbar spine. The quality of the pain is described as aching. The pain does not radiate. The pain is at a severity of 2/10. The pain is mild. The pain is the same all the time. The symptoms are aggravated by bending and twisting. Stiffness is present in the morning. Pertinent negatives include no abdominal pain, chest pain, dysuria, fever, headaches, numbness or weakness. (None) Risk factors include lack of exercise, obesity and sedentary lifestyle. He has tried analgesics, heat, ice and NSAIDs for the symptoms. The treatment provided moderate relief.        The following portions of the patient's history were reviewed and updated as appropriate:  allergies, current medications, past family history, past medical history, past social history, past surgical history and problem list.    Review of Systems   Constitutional: Negative.  Negative for activity change, appetite change, chills, diaphoresis, fatigue, fever, irritability, malaise/fatigue and unexpected weight change.   HENT: Positive for congestion (occassionally to rarely). Negative for dental problem, drooling, ear discharge, ear pain, facial swelling, hearing loss, mouth sores, nosebleeds, postnasal drip, rhinorrhea, sinus pain, sinus pressure, sneezing, sore throat, tinnitus, trouble swallowing and voice change.    Eyes: Negative.  Negative for blurred vision, photophobia, pain, discharge, redness, itching and visual disturbance.   Respiratory: Positive for cough (occassionally to rarely, relieved with albuterol inhaler use.). Negative for apnea, choking, chest tightness, shortness of breath, wheezing and stridor.    Cardiovascular: Positive for leg swelling (has had leg swelling in the past, states it subsided after he quit taking Lyrica.). Negative for chest pain, palpitations and PND.   Gastrointestinal: Negative for abdominal distention, abdominal pain, anal bleeding, blood in stool, constipation, diarrhea, nausea, rectal pain and vomiting.   Endocrine: Negative.  Negative for cold intolerance, heat intolerance, polydipsia, polyphagia and polyuria.        Hx of prediabetic glucose and A1C levels, last one was around 6.1% in May or so.    Genitourinary: Negative for decreased urine volume, difficulty urinating, discharge, dysuria, enuresis, flank pain, frequency, genital sores, hematuria, impotence, penile pain, penile swelling, scrotal swelling, testicular pain and urgency.        History of elevated PSA   Musculoskeletal: Positive for arthralgias (lower extremities and shoulders at times), back pain, neck pain (mild) and neck stiffness (since cervical fusion remote years ago).   Skin: Positive  for color change (bilateral lower legs are darkly discolored (brown)). Negative for pallor, rash and wound.   Allergic/Immunologic: Negative.  Negative for environmental allergies, food allergies and immunocompromised state.   Neurological: Negative.  Negative for dizziness, tremors, seizures, syncope, facial asymmetry, speech difficulty, weakness, light-headedness, numbness and headaches.   Hematological: Negative.  Negative for adenopathy. Does not bruise/bleed easily.   Psychiatric/Behavioral: Negative for agitation, behavioral problems, confusion, decreased concentration, dysphoric mood, hallucinations, self-injury, sleep disturbance and suicidal ideas. The patient is nervous/anxious (hx of anxiety states is controlled with meds satisfactorily). The patient does not have insomnia and is not hyperactive.        Objective   Physical Exam   Constitutional: He is oriented to person, place, and time. He appears well-developed and well-nourished. No distress.   HENT:   Head: Normocephalic and atraumatic.   Right Ear: External ear normal.   Left Ear: External ear normal.   Nose: Nose normal.   Mouth/Throat: Oropharynx is clear and moist. No oropharyngeal exudate.   Eyes: Pupils are equal, round, and reactive to light. Conjunctivae and EOM are normal. Right eye exhibits no discharge. Left eye exhibits no discharge. No scleral icterus.   Neck: Normal range of motion. Neck supple. No JVD present. No tracheal deviation present. No thyromegaly present.   Cardiovascular: Normal rate, regular rhythm, normal heart sounds and intact distal pulses.  Exam reveals no gallop and no friction rub.    No murmur heard.  Pulmonary/Chest: Effort normal and breath sounds normal. No stridor. No respiratory distress. He has no wheezes. He has no rales. He exhibits no tenderness.   Abdominal: Soft. Bowel sounds are normal. He exhibits no distension and no mass. There is no tenderness. There is no rebound and no guarding. No hernia.    Genitourinary:   Genitourinary Comments: deferred   Musculoskeletal: He exhibits no edema, tenderness or deformity.   Lymphadenopathy:     He has no cervical adenopathy.   Neurological: He is alert and oriented to person, place, and time. He displays normal reflexes. No cranial nerve deficit or sensory deficit. He exhibits normal muscle tone. Coordination normal.   Skin: Skin is warm and dry. Capillary refill takes less than 2 seconds. No rash noted. He is not diaphoretic. No erythema. No pallor.   Psychiatric: He has a normal mood and affect. His behavior is normal. Judgment and thought content normal.   Nursing note and vitals reviewed.      Assessment/Plan   Isamar was seen today for establish care.    Diagnoses and all orders for this visit:    Encounter for current long-term use of anticoagulants  -     Cancel: Protime-INR, Fingerstick; Future  -     Cancel: Protime-INR; Future  -     Protime-INR    History of prediabetes  -     Hemoglobin A1c; Future    Essential hypertension  -     Comprehensive Metabolic Panel; Future  -     CBC & Differential; Future  -     Lipid Panel With LDL / HDL Ratio; Future  -     Magnesium; Future    Mixed hyperlipidemia  -     Comprehensive Metabolic Panel; Future  -     CBC & Differential; Future  -     Lipid Panel With LDL / HDL Ratio; Future  -     Magnesium; Future  -     Cancel: Lipid Panel; Future    Chronic bilateral low back pain without sciatica    Chronic left shoulder pain    Polyneuropathy associated with underlying disease (CMS/HCC)    Chronic pain disorder    Elevated prostate specific antigen (PSA)    Long term current use of anticoagulant therapy    Prostate cancer screening    Smoking addiction    Atrial fibrillation, chronic (CMS/HCC)    COPD (chronic obstructive pulmonary disease) with chronic bronchitis (CMS/HCC)    Long term use of bisphosphonates    High risk medications (not anticoagulants) long-term use  -     Digoxin level; Future    Other orders  -      atorvastatin (LIPITOR) 40 MG tablet; Take 1 tablet by mouth Daily.  -     digoxin (DIGOX) 125 MCG tablet; Take 1 tablet by mouth Daily.  -     enalapril (VASOTEC) 5 MG tablet; Take 1 tablet every day  -     metoprolol succinate XL (TOPROL-XL) 50 MG 24 hr tablet; Take 1 tablet by mouth Daily.  -     PROAIR  (90 Base) MCG/ACT inhaler; Inhale 2 puffs Every 4 (Four) Hours As Needed for Wheezing or Shortness of Air.  -     venlafaxine (EFFEXOR) 75 MG tablet; Take 1 tablet by mouth Daily With Breakfast.        Return in about 3 months (around 12/18/2018) for Next scheduled follow up.   Patient Instructions   Will need a repeat Protime/ INR at interval determined after review of todays result, but at least monthly for warfarin dosing safety.   It was a pleasure to meet you!!              This document has been electronically signed by BOSTON Marie on September 18, 2018 3:04 PM

## 2018-09-18 NOTE — PATIENT INSTRUCTIONS
Will need a repeat Protime/ INR at interval determined after review of todays result, but at least monthly for warfarin dosing safety.   It was a pleasure to meet you!!

## 2018-10-02 ENCOUNTER — OFFICE VISIT (OUTPATIENT)
Dept: FAMILY MEDICINE CLINIC | Facility: CLINIC | Age: 73
End: 2018-10-02

## 2018-10-02 VITALS
HEART RATE: 96 BPM | HEIGHT: 73 IN | BODY MASS INDEX: 33.13 KG/M2 | WEIGHT: 250 LBS | RESPIRATION RATE: 14 BRPM | SYSTOLIC BLOOD PRESSURE: 130 MMHG | DIASTOLIC BLOOD PRESSURE: 62 MMHG | OXYGEN SATURATION: 97 %

## 2018-10-02 DIAGNOSIS — Z79.899 LONG TERM PRESCRIPTION BENZODIAZEPINE USE: Chronic | ICD-10-CM

## 2018-10-02 DIAGNOSIS — G47.00 INSOMNIA, UNSPECIFIED TYPE: Primary | Chronic | ICD-10-CM

## 2018-10-02 LAB
AMPHET+METHAMPHET UR QL: NEGATIVE
BARBITURATES UR QL SCN: NEGATIVE
BENZODIAZ UR QL SCN: NEGATIVE
CANNABINOIDS SERPL QL: NEGATIVE
COCAINE UR QL: NEGATIVE
METHADONE UR QL SCN: NEGATIVE
OPIATES UR QL: NEGATIVE
OXYCODONE UR QL SCN: NEGATIVE

## 2018-10-02 PROCEDURE — 80307 DRUG TEST PRSMV CHEM ANLYZR: CPT | Performed by: NURSE PRACTITIONER

## 2018-10-02 PROCEDURE — 99203 OFFICE O/P NEW LOW 30 MIN: CPT | Performed by: NURSE PRACTITIONER

## 2018-10-02 RX ORDER — ZOLPIDEM TARTRATE 10 MG/1
10 TABLET ORAL NIGHTLY PRN
Qty: 30 TABLET | Refills: 5 | Status: SHIPPED | OUTPATIENT
Start: 2018-10-02

## 2018-10-02 RX ORDER — ZOLPIDEM TARTRATE 10 MG/1
10 TABLET ORAL DAILY
Qty: 30 TABLET | Refills: 3 | OUTPATIENT
Start: 2018-10-02

## 2018-10-02 NOTE — TELEPHONE ENCOUNTER
Required patient visit for insomnia and Rx for ambien with ROBERT and ABDULKADIR and contract, this was completed before 4:25pm today. [email or web address]

## 2018-10-02 NOTE — PROGRESS NOTES
Subjective   Isamar Rodriguez is a 72 y.o. male.     Patient presents with new complaint of chronic insomnia. Didn't mention at last visit because he wasn't due for a refill yet. Has had insomnia for over half his life, long term use of ambien.       Insomnia   This is a chronic problem. The current episode started more than 1 year ago. The problem occurs daily. The problem has been unchanged. Pertinent negatives include no chest pain, coughing, fever or rash. Nothing aggravates the symptoms. Treatments tried: ambien. The treatment provided significant relief.        The following portions of the patient's history were reviewed and updated as appropriate: allergies, current medications, past family history, past medical history, past social history, past surgical history and problem list.    Review of Systems   Constitutional: Negative.  Negative for fever and unexpected weight change.   HENT: Negative.    Eyes: Negative.    Respiratory: Negative.  Negative for cough, chest tightness and shortness of breath.    Cardiovascular: Negative.  Negative for chest pain.   Gastrointestinal: Negative.    Endocrine: Negative.    Genitourinary: Negative.  Negative for dysuria.   Musculoskeletal: Negative.    Skin: Negative.  Negative for color change, pallor, rash and wound.   Allergic/Immunologic: Negative.    Neurological: Negative.    Hematological: Negative.    Psychiatric/Behavioral: Negative for sleep disturbance and suicidal ideas. The patient has insomnia.        Objective   Physical Exam   Constitutional: He is oriented to person, place, and time. He appears well-developed and well-nourished.   HENT:   Head: Normocephalic and atraumatic.   Eyes: Pupils are equal, round, and reactive to light. Conjunctivae are normal.   Neck: Normal range of motion. Neck supple.   Cardiovascular: Normal rate, regular rhythm, normal heart sounds and intact distal pulses.  Exam reveals no gallop and no friction rub.    No murmur  heard.  Pulmonary/Chest: Effort normal and breath sounds normal. No respiratory distress. He has no wheezes. He has no rales. He exhibits no tenderness.   Abdominal: Soft. Bowel sounds are normal.   Musculoskeletal: Normal range of motion. He exhibits no edema, tenderness or deformity.   Lymphadenopathy:     He has no cervical adenopathy.   Neurological: He is alert and oriented to person, place, and time.   Skin: Skin is warm and dry. Capillary refill takes 2 to 3 seconds. No erythema. No pallor.   Psychiatric: He has a normal mood and affect. His behavior is normal. Judgment and thought content normal.   Nursing note and vitals reviewed.      Assessment/Plan   Isamar was seen today for follow-up.    Diagnoses and all orders for this visit:    Insomnia, unspecified type  Comments:  new problem for my management, chronic problem, usually managed with ambien, needs refills. will need a ABDULKADIR report and UDS today.  Orders:  -     Urine Drug Screen - Urine, Clean Catch    Long term prescription benzodiazepine use  -     Urine Drug Screen - Urine, Clean Catch    Other orders  -     zolpidem (AMBIEN) 10 MG tablet; Take 1 tablet by mouth At Night As Needed for Sleep.    Patient understands the risks associated with this controlled medication, including tolerance and addiction.  Patient also agrees to only obtain this medication from me, and not from a another provider, unless that provider is covering for me in my absence.  Patient also agrees to be compliant in dosing, and not self adjust the dose of medication.  A signed controlled substance agreement is on file, and the patient has received a controlled substance education sheet at this a previous visit.  The patient has also signed a consent for treatment with a controlled substance as per [facility] policy. ABDULKADIR was obtained.  Return in about 1 month (around 11/2/2018).   There are no Patient Instructions on file for this visit.     Late entry 10/4/18 6pm- pt  will need a specific UDS in future, such as [id number] MANFRED-DIS  ([id number]) as the standard UDS does not specify for ambien. Lab note written in error a few minutes ago stating he needs a gabapentin add on lab level-  This should have been the Toxassure as listed for ambien, not gabapentin. Emili Humphries      This document has been electronically signed by BOSTON Marie on October 4, 2018 6:03 PM

## 2018-10-24 ENCOUNTER — LAB (OUTPATIENT)
Dept: LAB | Facility: OTHER | Age: 73
End: 2018-10-24

## 2018-10-24 ENCOUNTER — TRANSCRIBE ORDERS (OUTPATIENT)
Dept: LAB | Facility: OTHER | Age: 73
End: 2018-10-24

## 2018-10-24 DIAGNOSIS — E11.9 TYPE 2 DIABETES MELLITUS WITHOUT COMPLICATION, UNSPECIFIED WHETHER LONG TERM INSULIN USE (HCC): ICD-10-CM

## 2018-10-24 DIAGNOSIS — I48.91 ATRIAL FIBRILLATION, UNSPECIFIED TYPE (HCC): Primary | ICD-10-CM

## 2018-10-24 DIAGNOSIS — I48.91 ATRIAL FIBRILLATION, UNSPECIFIED TYPE (HCC): ICD-10-CM

## 2018-10-24 LAB
ALBUMIN UR-MCNC: 2.3 MG/L
INR PPP: 1.58 (ref 0.8–1.2)
PROTHROMBIN TIME: 18.3 SECONDS (ref 11.1–15.3)

## 2018-10-24 PROCEDURE — 85610 PROTHROMBIN TIME: CPT | Performed by: INTERNAL MEDICINE

## 2018-10-24 PROCEDURE — 36415 COLL VENOUS BLD VENIPUNCTURE: CPT | Performed by: INTERNAL MEDICINE

## 2018-10-24 PROCEDURE — 82043 UR ALBUMIN QUANTITATIVE: CPT | Performed by: INTERNAL MEDICINE

## 2019-04-23 ENCOUNTER — TRANSCRIBE ORDERS (OUTPATIENT)
Dept: GENERAL RADIOLOGY | Facility: CLINIC | Age: 74
End: 2019-04-23

## 2019-04-23 ENCOUNTER — LAB (OUTPATIENT)
Dept: LAB | Facility: OTHER | Age: 74
End: 2019-04-23

## 2019-04-23 DIAGNOSIS — I48.91 ATRIAL FIBRILLATION, UNSPECIFIED TYPE (HCC): Primary | ICD-10-CM

## 2019-04-23 DIAGNOSIS — I48.91 ATRIAL FIBRILLATION, UNSPECIFIED TYPE (HCC): ICD-10-CM

## 2019-04-23 LAB
INR PPP: 1.39 (ref 0.8–1.2)
PROTHROMBIN TIME: 16.7 SECONDS (ref 11.1–15.3)

## 2019-04-23 PROCEDURE — 85610 PROTHROMBIN TIME: CPT | Performed by: INTERNAL MEDICINE

## 2019-04-23 PROCEDURE — 36415 COLL VENOUS BLD VENIPUNCTURE: CPT | Performed by: INTERNAL MEDICINE

## 2019-05-03 ENCOUNTER — LAB (OUTPATIENT)
Dept: LAB | Facility: OTHER | Age: 74
End: 2019-05-03

## 2019-05-03 DIAGNOSIS — I48.91 ATRIAL FIBRILLATION, UNSPECIFIED TYPE (HCC): ICD-10-CM

## 2019-05-03 LAB
INR PPP: 1.48 (ref 0.8–1.2)
PROTHROMBIN TIME: 17.4 SECONDS (ref 11.1–15.3)

## 2019-05-03 PROCEDURE — 85610 PROTHROMBIN TIME: CPT | Performed by: INTERNAL MEDICINE

## 2019-05-03 PROCEDURE — 36415 COLL VENOUS BLD VENIPUNCTURE: CPT | Performed by: INTERNAL MEDICINE

## 2019-05-13 ENCOUNTER — LAB (OUTPATIENT)
Dept: LAB | Facility: OTHER | Age: 74
End: 2019-05-13

## 2019-05-13 DIAGNOSIS — I48.91 ATRIAL FIBRILLATION, UNSPECIFIED TYPE (HCC): ICD-10-CM

## 2019-05-13 LAB
INR PPP: 1.6 (ref 0.8–1.2)
PROTHROMBIN TIME: 18.5 SECONDS (ref 11.1–15.3)

## 2019-05-13 PROCEDURE — 36415 COLL VENOUS BLD VENIPUNCTURE: CPT | Performed by: INTERNAL MEDICINE

## 2019-05-13 PROCEDURE — 85610 PROTHROMBIN TIME: CPT | Performed by: INTERNAL MEDICINE

## 2019-05-13 RX ORDER — DIGOXIN 125 MCG
TABLET ORAL
Qty: 90 TABLET | Refills: 0 | OUTPATIENT
Start: 2019-05-13

## 2019-05-13 RX ORDER — ENALAPRIL MALEATE 5 MG/1
TABLET ORAL
Qty: 90 TABLET | Refills: 0 | OUTPATIENT
Start: 2019-05-13

## 2019-06-27 ENCOUNTER — LAB (OUTPATIENT)
Dept: LAB | Facility: OTHER | Age: 74
End: 2019-06-27

## 2019-06-27 DIAGNOSIS — I48.91 ATRIAL FIBRILLATION, UNSPECIFIED TYPE (HCC): ICD-10-CM

## 2019-06-27 LAB
INR PPP: 2 (ref 0.8–1.2)
PROTHROMBIN TIME: 22.3 SECONDS (ref 11.1–15.3)

## 2019-06-27 PROCEDURE — 85610 PROTHROMBIN TIME: CPT | Performed by: INTERNAL MEDICINE

## 2019-06-27 PROCEDURE — 36415 COLL VENOUS BLD VENIPUNCTURE: CPT | Performed by: INTERNAL MEDICINE

## 2019-07-25 ENCOUNTER — LAB (OUTPATIENT)
Dept: LAB | Facility: OTHER | Age: 74
End: 2019-07-25

## 2019-07-25 DIAGNOSIS — I48.91 ATRIAL FIBRILLATION, UNSPECIFIED TYPE (HCC): ICD-10-CM

## 2019-07-25 LAB
INR PPP: 1.91 (ref 0.8–1.2)
PROTHROMBIN TIME: 21.4 SECONDS (ref 11.1–15.3)

## 2019-07-25 PROCEDURE — 85610 PROTHROMBIN TIME: CPT | Performed by: INTERNAL MEDICINE

## 2019-07-25 PROCEDURE — 36415 COLL VENOUS BLD VENIPUNCTURE: CPT | Performed by: INTERNAL MEDICINE

## 2019-08-08 ENCOUNTER — LAB (OUTPATIENT)
Dept: LAB | Facility: OTHER | Age: 74
End: 2019-08-08

## 2019-08-08 DIAGNOSIS — I48.91 ATRIAL FIBRILLATION, UNSPECIFIED TYPE (HCC): ICD-10-CM

## 2019-08-08 LAB
INR PPP: 2.57 (ref 0.8–1.2)
PROTHROMBIN TIME: 27.1 SECONDS (ref 11.1–15.3)

## 2019-08-08 PROCEDURE — 85610 PROTHROMBIN TIME: CPT | Performed by: INTERNAL MEDICINE

## 2019-08-08 PROCEDURE — 36415 COLL VENOUS BLD VENIPUNCTURE: CPT | Performed by: INTERNAL MEDICINE

## 2019-09-23 ENCOUNTER — LAB (OUTPATIENT)
Dept: LAB | Facility: OTHER | Age: 74
End: 2019-09-23

## 2019-09-23 DIAGNOSIS — I48.91 ATRIAL FIBRILLATION, UNSPECIFIED TYPE (HCC): ICD-10-CM

## 2019-09-23 LAB
INR PPP: 2.9 (ref 0.8–1.2)
PROTHROMBIN TIME: 29.8 SECONDS (ref 11.1–15.3)

## 2019-09-23 PROCEDURE — 36415 COLL VENOUS BLD VENIPUNCTURE: CPT | Performed by: INTERNAL MEDICINE

## 2019-09-23 PROCEDURE — 85610 PROTHROMBIN TIME: CPT | Performed by: INTERNAL MEDICINE

## 2019-10-25 ENCOUNTER — LAB (OUTPATIENT)
Dept: LAB | Facility: OTHER | Age: 74
End: 2019-10-25

## 2019-10-25 DIAGNOSIS — I48.91 ATRIAL FIBRILLATION, UNSPECIFIED TYPE (HCC): ICD-10-CM

## 2019-10-25 LAB
INR PPP: 2.89 (ref 0.8–1.2)
PROTHROMBIN TIME: 29.7 SECONDS (ref 11.1–15.3)

## 2019-10-25 PROCEDURE — 36415 COLL VENOUS BLD VENIPUNCTURE: CPT | Performed by: INTERNAL MEDICINE

## 2019-10-25 PROCEDURE — 85610 PROTHROMBIN TIME: CPT | Performed by: INTERNAL MEDICINE

## 2019-12-02 ENCOUNTER — TRANSCRIBE ORDERS (OUTPATIENT)
Dept: PODIATRY | Facility: CLINIC | Age: 74
End: 2019-12-02

## 2019-12-02 DIAGNOSIS — M79.671 PAIN IN BOTH FEET: Primary | ICD-10-CM

## 2019-12-02 DIAGNOSIS — M20.41 HAMMERTOES OF BOTH FEET: ICD-10-CM

## 2019-12-02 DIAGNOSIS — M79.672 PAIN IN BOTH FEET: Primary | ICD-10-CM

## 2019-12-02 DIAGNOSIS — M20.42 HAMMERTOES OF BOTH FEET: ICD-10-CM

## 2019-12-20 ENCOUNTER — OFFICE VISIT (OUTPATIENT)
Dept: PODIATRY | Facility: CLINIC | Age: 74
End: 2019-12-20

## 2019-12-20 VITALS — HEIGHT: 73 IN | HEART RATE: 79 BPM | BODY MASS INDEX: 33.13 KG/M2 | WEIGHT: 250 LBS | OXYGEN SATURATION: 93 %

## 2019-12-20 DIAGNOSIS — M79.674 PAIN IN TOES OF BOTH FEET: ICD-10-CM

## 2019-12-20 DIAGNOSIS — L84 FOOT CALLUS: ICD-10-CM

## 2019-12-20 DIAGNOSIS — M79.675 PAIN IN TOES OF BOTH FEET: ICD-10-CM

## 2019-12-20 DIAGNOSIS — M79.671 PAIN IN BOTH FEET: ICD-10-CM

## 2019-12-20 DIAGNOSIS — M79.672 PAIN IN BOTH FEET: ICD-10-CM

## 2019-12-20 DIAGNOSIS — M20.42 HAMMER TOES OF BOTH FEET: Primary | ICD-10-CM

## 2019-12-20 DIAGNOSIS — B35.1 ONYCHOMYCOSIS: ICD-10-CM

## 2019-12-20 DIAGNOSIS — M20.41 HAMMER TOES OF BOTH FEET: Primary | ICD-10-CM

## 2019-12-20 PROCEDURE — 99204 OFFICE O/P NEW MOD 45 MIN: CPT | Performed by: PODIATRIST

## 2019-12-20 RX ORDER — BUPIVACAINE HCL/0.9 % NACL/PF 0.1 %
2 PLASTIC BAG, INJECTION (ML) EPIDURAL ONCE
Status: CANCELLED | OUTPATIENT
Start: 2020-01-15 | End: 2019-12-20

## 2019-12-20 NOTE — PROGRESS NOTES
Isamar Rodriguez  1945  74 y.o. male      Patient presents in clinic today for bilateral foot pain, pain 7/10 possible hammertoes.       12/20/2019  Chief Complaint   Patient presents with   • Left Foot - Pain, Hammer Toe   • Right Foot - Pain, Hammer Toe           History of Present Illness    Isamar Rodriguez is a 74 y.o. male who presents for evaluation of bilateral foot pain.  States pain seems to be focused in his toes of both feet.  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  He states that he feels he walks on the tips of his toes and this causes sharp pain especially in close toed shoe gear.  He states it is difficult for him to care for his feet on his own.  He denies any recent trauma or injuries.  Pain is relatively relieved with rest.  He is curious of any possible surgical intervention.      Past Medical History:   Diagnosis Date   • Cataract          History reviewed. No pertinent surgical history.      History reviewed. No pertinent family history.      Social History     Socioeconomic History   • Marital status:      Spouse name: Not on file   • Number of children: Not on file   • Years of education: Not on file   • Highest education level: Not on file   Tobacco Use   • Smoking status: Current Every Day Smoker   • Smokeless tobacco: Never Used         Current Outpatient Medications   Medication Sig Dispense Refill   • digoxin (DIGOX) 125 MCG tablet Take 1 tablet by mouth Daily. 90 tablet 1   • enalapril (VASOTEC) 5 MG tablet Take 1 tablet every day 90 tablet 1   • meclizine (ANTIVERT) 25 MG tablet   5   • metoprolol succinate XL (TOPROL-XL) 50 MG 24 hr tablet Take 1 tablet by mouth Daily. 90 tablet 1   • PROAIR  (90 Base) MCG/ACT inhaler Inhale 2 puffs Every 4 (Four) Hours As Needed for Wheezing or Shortness of Air. 1 inhaler 5   • venlafaxine (EFFEXOR) 75 MG tablet  "Take 1 tablet by mouth Daily With Breakfast. 90 tablet 1   • warfarin (COUMADIN) 5 MG tablet TAKE 1 TABLET(5MG) BY MOUTH FOR 2 DAYS AND ALTERNATE WITH 1.5 TABLET(7.5MG) FOR 2 DAYS AS DIRECTED     • zolpidem (AMBIEN) 10 MG tablet Take 1 tablet by mouth At Night As Needed for Sleep. 30 tablet 5   • atorvastatin (LIPITOR) 40 MG tablet Take 1 tablet by mouth Daily. 90 tablet 1     No current facility-administered medications for this visit.          OBJECTIVE    Pulse 79   Ht 185.4 cm (73\")   Wt 113 kg (250 lb)   SpO2 93%   BMI 32.98 kg/m²       Review of Systems   Constitutional: Negative.    HENT: Negative.    Eyes: Negative.    Respiratory: Negative.    Cardiovascular: Negative.    Gastrointestinal: Negative.    Endocrine: Negative.    Genitourinary: Negative.    Musculoskeletal:        Bilateral foot pain    Skin: Negative.    Allergic/Immunologic: Negative.    Neurological: Negative.    Hematological: Negative.    Psychiatric/Behavioral: Negative.        Physical Exam   Constitutional: He is oriented to person, place, and time. He appears well-developed and well-nourished.   HENT:   Head: Normocephalic and atraumatic.   Eyes: Pupils are equal, round, and reactive to light. No scleral icterus.   Neck: Neck supple.   Cardiovascular: Normal rate and regular rhythm.   Pulmonary/Chest: Effort normal and breath sounds normal.   Abdominal: Soft. He exhibits no distension.   Lymphadenopathy:     He has no cervical adenopathy.   Neurological: He is alert and oriented to person, place, and time.   Psychiatric: He has a normal mood and affect. His behavior is normal.             Lower Extremity Exam:  Vascular: DP/PT pulses palpable 2+.   2+ yesenia-malleolar edema  Foot warm  CFT wnl  Neuro: Protective sensation intact, b/l.  DTRs intact  Integument: No open wounds  Distal clavi to bilateral second toes.  Positive tenderness on palpation  Nails 1 through 5 bilateral thickened elongated dystrophic with subungual " debris.  Chronic venous stasis dermatitis noted bilaterally.  No erythema, scaling  Skin quality normal  Musculoskeletal: LE muscle strength 5/5.   Gait normal  Ankle ROM decreased without pain or crepitus  STJ ROM full without pain or crepitus  1st MTPJ ROM decreased   Bilateral fourth toe abductovarus contracture.  Semirigid hammertoe contracture of toes 2 and 3 bilateral.              ASSESSMENT AND PLAN    Isamar was seen today for pain, hammer toe, pain and hammer toe.    Diagnoses and all orders for this visit:    Hammer toes of both feet  -     Case Request    Pain in both feet  -     XR foot 3+ vw bilateral; Future    Foot callus    Onychomycosis    Pain in toes of both feet  -     Case Request    Other orders  -     Follow Anesthesia Guidelines / Standing Orders; Future  -     Obtain Informed Consent; Future  -     Provide Instructions to Patient Regarding NPO Status; Future      -Comprehensive foot and ankle exam performed  -Radiographs ordered and reviewed  -Educated pt on diagnosis, etiology and treatment of hammertoe deformity  -Recommend soft, wide toe box accommodative shoe gear.  -Patient interested in surgical correction.  We did discuss bilateral partial fourth toe amputation as well as correction of hammertoes 2 and 3 bilaterally.  I believe he will be able to obtain adequate correction of digits 2 and 3 with simple percutaneous flexor tenotomy.  Patient is in agreement with this plan.  All risks, benefits and potential complications discussed.  -We will tentatively plan for 1/15/2020  -Recheck 4 weeks          This document has been electronically signed by Darryl Sellers DPM on December 20, 2019 2:56 PM     EMR Dragon/Transcription disclaimer:   Much of this encounter note is an electronic transcription/translation of spoken language to printed text. The electronic translation of spoken language may permit erroneous, or at times, nonsensical words or phrases to be inadvertently transcribed;  Although I have reviewed the note for such errors, some may still exist.    Darryl Sellers DPM  12/20/2019  2:56 PM

## 2019-12-27 ENCOUNTER — LAB (OUTPATIENT)
Dept: LAB | Facility: OTHER | Age: 74
End: 2019-12-27

## 2019-12-27 DIAGNOSIS — I48.91 ATRIAL FIBRILLATION, UNSPECIFIED TYPE (HCC): ICD-10-CM

## 2019-12-27 LAB
INR PPP: 8.08 (ref 0.8–1.2)
PROTHROMBIN TIME: 66.8 SECONDS (ref 11.1–15.3)

## 2019-12-27 PROCEDURE — 85610 PROTHROMBIN TIME: CPT | Performed by: INTERNAL MEDICINE

## 2019-12-27 PROCEDURE — 36415 COLL VENOUS BLD VENIPUNCTURE: CPT | Performed by: INTERNAL MEDICINE

## 2020-01-02 PROBLEM — M79.675 PAIN IN TOES OF BOTH FEET: Status: ACTIVE | Noted: 2020-01-02

## 2020-01-02 PROBLEM — M20.41 HAMMER TOES OF BOTH FEET: Status: ACTIVE | Noted: 2020-01-02

## 2020-01-02 PROBLEM — M79.674 PAIN IN TOES OF BOTH FEET: Status: ACTIVE | Noted: 2020-01-02

## 2020-01-02 PROBLEM — M20.42 HAMMER TOES OF BOTH FEET: Status: ACTIVE | Noted: 2020-01-02

## 2020-01-09 ENCOUNTER — APPOINTMENT (OUTPATIENT)
Dept: PREADMISSION TESTING | Facility: HOSPITAL | Age: 75
End: 2020-01-09

## 2020-01-09 VITALS
RESPIRATION RATE: 20 BRPM | SYSTOLIC BLOOD PRESSURE: 149 MMHG | DIASTOLIC BLOOD PRESSURE: 71 MMHG | BODY MASS INDEX: 37.37 KG/M2 | HEIGHT: 73 IN | HEART RATE: 70 BPM | WEIGHT: 282 LBS | OXYGEN SATURATION: 96 %

## 2020-01-09 LAB — DIGOXIN SERPL-MCNC: 0.6 NG/ML (ref 0.6–1.2)

## 2020-01-09 PROCEDURE — 93005 ELECTROCARDIOGRAM TRACING: CPT

## 2020-01-09 PROCEDURE — 80162 ASSAY OF DIGOXIN TOTAL: CPT | Performed by: ANESTHESIOLOGY

## 2020-01-09 PROCEDURE — 36415 COLL VENOUS BLD VENIPUNCTURE: CPT

## 2020-01-09 PROCEDURE — 93010 ELECTROCARDIOGRAM REPORT: CPT | Performed by: INTERNAL MEDICINE

## 2020-01-09 RX ORDER — DIGOXIN 125 MCG
125 TABLET ORAL NIGHTLY
COMMUNITY

## 2020-01-09 RX ORDER — METOPROLOL SUCCINATE 50 MG/1
50 TABLET, EXTENDED RELEASE ORAL NIGHTLY
Status: ON HOLD | COMMUNITY
End: 2021-07-10

## 2020-01-09 RX ORDER — WARFARIN SODIUM 7.5 MG/1
7.5 TABLET ORAL TAKE AS DIRECTED
COMMUNITY
End: 2020-08-06

## 2020-01-09 RX ORDER — VARENICLINE TARTRATE 1 MG/1
1 TABLET, FILM COATED ORAL NIGHTLY
COMMUNITY
End: 2020-08-06

## 2020-01-09 RX ORDER — ENALAPRIL MALEATE 5 MG/1
5 TABLET ORAL NIGHTLY
COMMUNITY

## 2020-01-09 RX ORDER — METOPROLOL TARTRATE 50 MG/1
50 TABLET, FILM COATED ORAL NIGHTLY
COMMUNITY
End: 2020-01-09

## 2020-01-09 RX ORDER — SODIUM CHLORIDE, SODIUM GLUCONATE, SODIUM ACETATE, POTASSIUM CHLORIDE, AND MAGNESIUM CHLORIDE 526; 502; 368; 37; 30 MG/100ML; MG/100ML; MG/100ML; MG/100ML; MG/100ML
1000 INJECTION, SOLUTION INTRAVENOUS CONTINUOUS
Status: CANCELLED | OUTPATIENT
Start: 2020-01-15

## 2020-01-09 RX ORDER — ATORVASTATIN CALCIUM 40 MG/1
40 TABLET, FILM COATED ORAL NIGHTLY
COMMUNITY
End: 2020-08-06

## 2020-01-09 NOTE — DISCHARGE INSTRUCTIONS
Psychiatric  Pre-op Information and Guidelines    You will be called after 2 p.m. the day before your surgery (Friday for Monday surgery) and notified of your time for arrival and approximate surgery time.  If you have not received a call by 4P.M., please contact Same Day Surgery at [phone] of if outside [facility] call [phone].    Please Follow these Important Safety Guidelines:    • The morning of your procedure, take only the medications listed below with   A sip of water:_____________________________________________       ____INHALER_________________________________    • DO NOT eat or drink anything after 12:00 midnight the night before surgery  Specific instructions concerning drinking clear liquids will be discussed during  the pre-surgery instruction call the day before your surgery.    • If you take a blood thinner (ex. Plavix, Coumadin, aspirin), ask your doctor when to stop it before surgery  STOP DATE: _________________    • Only 2 visitors are allowed in patient rooms at a time  Your visitors will be asked to wait in the lobby until the admission process is complete with the exception of a parent with a child and patients in need of special assistance.    • YOU CANNOT DRIVE YOURSELF HOME  You must be accompanied by someone who will be responsible for driving you home after surgery and for your care at home.    • DO NOT chew gum, use breath mints, hard candy, or smoke the day of surgery  • DO NOT drink alcohol for at least 24 hours before your surgery  • DO NOT wear any jewelry and remove all body piercing before coming to the hospital  • DO NOT wear make-up to the hospital  • If you are having surgery on an extremity (arm/leg/foot) remove nail polish/artificial nails on the surgical side  • Clothing, glasses, contacts, dentures, and hairpieces must be removed before surgery  • Bathe the night before or the morning of your surgery and do not use powders/lotions on  skin.

## 2020-01-09 NOTE — PAT
EKG discussed with Dr. Shahid, no new orders received. Patient to hold digoxin starting 1/11/20, per Dr. Sellers's office, prior to surgery. Chlorhexidine wash and pre-op instructions given, patient voiced understanding.

## 2020-01-15 ENCOUNTER — APPOINTMENT (OUTPATIENT)
Dept: GENERAL RADIOLOGY | Facility: HOSPITAL | Age: 75
End: 2020-01-15

## 2020-01-15 ENCOUNTER — HOSPITAL ENCOUNTER (OUTPATIENT)
Facility: HOSPITAL | Age: 75
Setting detail: HOSPITAL OUTPATIENT SURGERY
Discharge: HOME | End: 2020-01-15
Attending: PODIATRIST | Admitting: PODIATRIST

## 2020-01-15 ENCOUNTER — ANESTHESIA EVENT (OUTPATIENT)
Dept: PERIOP | Facility: HOSPITAL | Age: 75
End: 2020-01-15

## 2020-01-15 ENCOUNTER — ANESTHESIA (OUTPATIENT)
Dept: PERIOP | Facility: HOSPITAL | Age: 75
End: 2020-01-15

## 2020-01-15 VITALS
SYSTOLIC BLOOD PRESSURE: 135 MMHG | BODY MASS INDEX: 36.9 KG/M2 | WEIGHT: 278.44 LBS | HEIGHT: 73 IN | HEART RATE: 95 BPM | OXYGEN SATURATION: 95 % | TEMPERATURE: 97.3 F | DIASTOLIC BLOOD PRESSURE: 62 MMHG | RESPIRATION RATE: 18 BRPM

## 2020-01-15 DIAGNOSIS — M20.42 HAMMER TOES OF BOTH FEET: ICD-10-CM

## 2020-01-15 DIAGNOSIS — M79.675 PAIN IN TOES OF BOTH FEET: ICD-10-CM

## 2020-01-15 DIAGNOSIS — M79.674 PAIN IN TOES OF BOTH FEET: ICD-10-CM

## 2020-01-15 DIAGNOSIS — M20.41 HAMMER TOES OF BOTH FEET: ICD-10-CM

## 2020-01-15 LAB
INR PPP: 1.32 (ref 0.8–1.2)
PROTHROMBIN TIME: 16.2 SECONDS (ref 11.1–15.3)

## 2020-01-15 PROCEDURE — C1713 ANCHOR/SCREW BN/BN,TIS/BN: HCPCS | Performed by: PODIATRIST

## 2020-01-15 PROCEDURE — 88311 DECALCIFY TISSUE: CPT | Performed by: PATHOLOGY

## 2020-01-15 PROCEDURE — 85610 PROTHROMBIN TIME: CPT | Performed by: ANESTHESIOLOGY

## 2020-01-15 PROCEDURE — 88305 TISSUE EXAM BY PATHOLOGIST: CPT | Performed by: PATHOLOGY

## 2020-01-15 PROCEDURE — 76000 FLUOROSCOPY <1 HR PHYS/QHP: CPT

## 2020-01-15 PROCEDURE — 25010000002 PROPOFOL 10 MG/ML EMULSION: Performed by: NURSE ANESTHETIST, CERTIFIED REGISTERED

## 2020-01-15 PROCEDURE — 28820 AMPUTATION OF TOE: CPT | Performed by: PODIATRIST

## 2020-01-15 PROCEDURE — 88311 DECALCIFY TISSUE: CPT | Performed by: PODIATRIST

## 2020-01-15 PROCEDURE — 28285 REPAIR OF HAMMERTOE: CPT | Performed by: PODIATRIST

## 2020-01-15 PROCEDURE — 28011 INCISION OF TOE TENDONS: CPT | Performed by: PODIATRIST

## 2020-01-15 PROCEDURE — 88305 TISSUE EXAM BY PATHOLOGIST: CPT | Performed by: PODIATRIST

## 2020-01-15 DEVICE — CANNULATED SCREW
Type: IMPLANTABLE DEVICE | Status: FUNCTIONAL
Brand: ASNIS

## 2020-01-15 RX ORDER — LIDOCAINE HYDROCHLORIDE 20 MG/ML
INJECTION, SOLUTION INFILTRATION; PERINEURAL AS NEEDED
Status: DISCONTINUED | OUTPATIENT
Start: 2020-01-15 | End: 2020-01-15 | Stop reason: SURG

## 2020-01-15 RX ORDER — BUPIVACAINE HCL/0.9 % NACL/PF 0.1 %
2 PLASTIC BAG, INJECTION (ML) EPIDURAL ONCE
Status: COMPLETED | OUTPATIENT
Start: 2020-01-15 | End: 2020-01-15

## 2020-01-15 RX ORDER — PROMETHAZINE HYDROCHLORIDE 25 MG/ML
12.5 INJECTION, SOLUTION INTRAMUSCULAR; INTRAVENOUS ONCE AS NEEDED
Status: CANCELLED | OUTPATIENT
Start: 2020-01-15

## 2020-01-15 RX ORDER — NALOXONE HCL 0.4 MG/ML
0.4 VIAL (ML) INJECTION AS NEEDED
Status: CANCELLED | OUTPATIENT
Start: 2020-01-15

## 2020-01-15 RX ORDER — EPHEDRINE SULFATE 50 MG/ML
5 INJECTION, SOLUTION INTRAVENOUS ONCE AS NEEDED
Status: CANCELLED | OUTPATIENT
Start: 2020-01-15

## 2020-01-15 RX ORDER — LABETALOL HYDROCHLORIDE 5 MG/ML
5 INJECTION, SOLUTION INTRAVENOUS
Status: CANCELLED | OUTPATIENT
Start: 2020-01-15

## 2020-01-15 RX ORDER — PROMETHAZINE HYDROCHLORIDE 25 MG/1
25 TABLET ORAL ONCE AS NEEDED
Status: CANCELLED | OUTPATIENT
Start: 2020-01-15

## 2020-01-15 RX ORDER — ACETAMINOPHEN 650 MG/1
650 SUPPOSITORY RECTAL ONCE AS NEEDED
Status: CANCELLED | OUTPATIENT
Start: 2020-01-15

## 2020-01-15 RX ORDER — ONDANSETRON 2 MG/ML
4 INJECTION INTRAMUSCULAR; INTRAVENOUS ONCE AS NEEDED
Status: CANCELLED | OUTPATIENT
Start: 2020-01-15

## 2020-01-15 RX ORDER — SODIUM CHLORIDE, SODIUM GLUCONATE, SODIUM ACETATE, POTASSIUM CHLORIDE, AND MAGNESIUM CHLORIDE 526; 502; 368; 37; 30 MG/100ML; MG/100ML; MG/100ML; MG/100ML; MG/100ML
1000 INJECTION, SOLUTION INTRAVENOUS CONTINUOUS
Status: DISCONTINUED | OUTPATIENT
Start: 2020-01-15 | End: 2020-01-15 | Stop reason: HOSPADM

## 2020-01-15 RX ORDER — DIPHENHYDRAMINE HYDROCHLORIDE 50 MG/ML
12.5 INJECTION INTRAMUSCULAR; INTRAVENOUS
Status: CANCELLED | OUTPATIENT
Start: 2020-01-15

## 2020-01-15 RX ORDER — FLUMAZENIL 0.1 MG/ML
0.2 INJECTION INTRAVENOUS AS NEEDED
Status: CANCELLED | OUTPATIENT
Start: 2020-01-15

## 2020-01-15 RX ORDER — BUPIVACAINE HYDROCHLORIDE 5 MG/ML
INJECTION, SOLUTION EPIDURAL; INTRACAUDAL AS NEEDED
Status: DISCONTINUED | OUTPATIENT
Start: 2020-01-15 | End: 2020-01-15 | Stop reason: HOSPADM

## 2020-01-15 RX ORDER — PROMETHAZINE HYDROCHLORIDE 25 MG/1
25 SUPPOSITORY RECTAL ONCE AS NEEDED
Status: CANCELLED | OUTPATIENT
Start: 2020-01-15

## 2020-01-15 RX ORDER — HYDROCODONE BITARTRATE AND ACETAMINOPHEN 7.5; 325 MG/1; MG/1
1 TABLET ORAL EVERY 6 HOURS PRN
Qty: 40 TABLET | Refills: 0 | Status: SHIPPED | OUTPATIENT
Start: 2020-01-15 | End: 2020-08-06

## 2020-01-15 RX ORDER — ACETAMINOPHEN 325 MG/1
650 TABLET ORAL ONCE AS NEEDED
Status: CANCELLED | OUTPATIENT
Start: 2020-01-15

## 2020-01-15 RX ADMIN — PROPOFOL 80 MCG/KG/MIN: 10 INJECTION, EMULSION INTRAVENOUS at 08:29

## 2020-01-15 RX ADMIN — Medication 2 G: at 08:25

## 2020-01-15 RX ADMIN — LIDOCAINE HYDROCHLORIDE 50 MG: 20 INJECTION, SOLUTION INFILTRATION; PERINEURAL at 08:29

## 2020-01-15 RX ADMIN — SODIUM CHLORIDE, SODIUM GLUCONATE, SODIUM ACETATE, POTASSIUM CHLORIDE, AND MAGNESIUM CHLORIDE 1000 ML: 526; 502; 368; 37; 30 INJECTION, SOLUTION INTRAVENOUS at 06:43

## 2020-01-15 NOTE — ANESTHESIA POSTPROCEDURE EVALUATION
Patient: Isamar Rodriguez    Procedure Summary     Date:  01/15/20 Room / Location:  [facility] OR 09 / [facility] OR    Anesthesia Start:  0823 Anesthesia Stop:  1001    Procedure:  Fourth toe amputation, second and third hammertoe correction (Bilateral ) Diagnosis:       Hammer toes of both feet      Pain in toes of both feet      (Hammer toes of both feet [M20.41, M20.42])      (Pain in toes of both feet [M79.674, M79.675])    Surgeon:  Darryl Sellers DPM Provider:  Dwight Liu MD    Anesthesia Type:  general ASA Status:  3          Anesthesia Type: general    Vitals  No vitals data found for the desired time range.          Post Anesthesia Care and Evaluation    Patient location during evaluation: PHASE II  Patient participation: complete - patient participated  Level of consciousness: awake and alert  Pain score: 0  Pain management: adequate  Airway patency: patent  Anesthetic complications: No anesthetic complications  PONV Status: none  Cardiovascular status: acceptable  Respiratory status: acceptable, unassisted, room air and spontaneous ventilation  Hydration status: acceptable

## 2020-01-15 NOTE — DISCHARGE INSTRUCTIONS
Leave dressing clean, dry and intact until your first postoperative visit.    You may heel weight bear as tolerated in your surgical boot only.     No driving until your first postoperative visit.     Take pain medications as prescribed.     Elevate surgical extremity above level of heart while at rest. Apply ice back behind knee for 10 minutes of every hour while at rest.     Contact doctor for increased pain, drainage, nausea, vomiting, fever or chills.

## 2020-01-15 NOTE — OP NOTE
PREOPERATIVE DIAGNOSES: Congenital hammertoe deformities, bilateral feet.     POSTOPERATIVE DIAGNOSES: Congenital hammertoe deformities, bilateral feet.     PROCEDURES PERFORMED:   1. Bilateral partial 4th toe amputation.   2. Bilateral flexor tenotomy of digits 3 as well as left 2nd flexor tenotomy.  3. Right 2nd hammertoe repair with proximal and distal interphalangeal joint arthrodesis.     SURGEON: Darryl Sellers DPM    ASSISTANT: Mariya Jasmine MA    ANESTHESIA: MAC with local.    HEMOSTASIS: Bilateral ankle tourniquets inflated to 250 mmHg for approximately 15 minutes.     MATERIALS: 3.0 mm cannulated Eusebio micro Asnis screw x 1.      INJECTABLES: 30 mL of 0.5% Marcaine plain.     SPECIMEN: Bilateral 4th toes.     COMPLICATIONS: None.     INDICATIONS FOR PROCEDURE: This is a patient seen on outpatient basis for worsening pain due to congenital toe contractures. Patient elected for bilateral 4th toe amputations as well as correction of hammertoes 2 and 3. All risks, benefits, potential complications were described in detail and no guarantees were given at any time. He has been n.p.o. since midnight. Informed consent has been obtained and located in the chart. Cefazolin 2 g was given as preoperative antibiotics in the preoperative holding area.     DESCRIPTION OF PROCEDURE: Under mild sedation the patient was brought to the operating room and placed on the operating table in supine position. Following sedation by the anesthesia department, the above-mentioned local anesthetic was injected in bilateral forefoot block fashion and the bilateral feet and ankles were prepped and draped in usual aseptic fashion. First attention was drawn to the left foot where the tourniquet was inflated and attention was drawn to the base of the 4th digit. A fishmouth type incision was planned and carried out full thickness and dissection was carried into the proximal interphalangeal joint where the toe was disarticulated and  passed off the surgical field. Dissection was then carried further proximally and the distal two-thirds of the proximal phalanx were resected and passed off the surgical field with a small bone saw. The skin edges were then remodeled to allow for appropriate closure and the skin was closed with 4-0 nylon. Attention was then drawn to the 2nd and 3rd digits where a percutaneous flexor tenotomy was made at the level of the proximal interphalangeal joint along with a plantar capsulotomy at the distal interphalangeal joint utilizing a Melcroft blade. A dorsiflexory force was then placed across these joints and the toes were noted to take a more rectus position. These small percutaneous incisions were then irrigated and closed with 4-0 nylon sutures. The tourniquet was deflated and immediate hyperemic response was noted to the remaining digits on the left foot.     Attention was then drawn to the right foot where the tourniquet was inflated and attention was once again drawn to the 4th digit. A digital amputation was performed on the right foot just as it was on the left and once again was closed with 4-0 nylon. Again, we performed a flexor tenotomy utilizing a small Melcroft blade to the 2nd and 3rd digits on the right foot just as we did on the left, however, the deformity on the right side digit was more rigid in nature. The determination was made that this would require a interphalangeal joint arthrodesis. Therefore, a linear longitudinal incision was made into the dorsal aspect of the 2nd digit transverse tenotomy was performed at the level of the proximal anterior phalangeal joint. A small bone saw was used to resect the cartilaginous surfaces. A smooth wire was then antegraded into the base of the middle phalanx extending out the tip of the 2nd toe and then further retrograded back into the proximal phalanx. With appropriate position of the toe intraoperative fluoroscopy confirmed appropriate placement of the temporary  wire and permanent fixation was achieved with single 3.0 mm cannulated screw. Temporary fixation was then removed. Incision was flushed with copious amounts of sterile saline. The tendon was reapproximated with 4-0 Vicryl and skin was closed in layered fashion. The tourniquet on the right foot was then deflated and immediate hyperemic response was noted. Dry, sterile dressing was applied bilaterally. The patient was taken from the operating room to the recovery room with vital signs stable and neurovascular status intact.

## 2020-01-15 NOTE — ANESTHESIA PREPROCEDURE EVALUATION
Anesthesia Evaluation     Patient summary reviewed   NPO Solid Status: > 8 hours  NPO Liquid Status: > 8 hours           Airway   Mallampati: II  TM distance: >3 FB  Neck ROM: full  No difficulty expected  Dental    (+) poor dentition    Pulmonary - normal exam   (+) a smoker Former, COPD mild,   Cardiovascular - normal exam  Exercise tolerance: good (4-7 METS)    NYHA Classification: II  ECG reviewed  PT is on anticoagulation therapy    (+) hypertension, dysrhythmias Atrial Fib, hyperlipidemia,     ROS comment: ekg on jan 9 reveals afib rate controlled 80's    Neuro/Psych  (+) psychiatric history Anxiety,     GI/Hepatic/Renal/Endo    (+) morbid obesity, GERD,      Musculoskeletal     (+) back pain, chronic pain, gait problem,   Abdominal    Substance History      OB/GYN          Other   arthritis,                    Anesthesia Plan    ASA 3     general     intravenous induction     Anesthetic plan, all risks, benefits, and alternatives have been provided, discussed and informed consent has been obtained with: patient.

## 2020-01-15 NOTE — INTERVAL H&P NOTE
Allergies   Allergen Reactions   • Codeine Itching     Itching  Pt denies allergy to codeine. 2/17/16 JIMY Engle   • Morphine Itching     itching   • Propoxyphene Itching       H&P reviewed. The patient was examined and there are no changes to the H&P.   Proceed as planned.          This document has been electronically signed by Darryl Sellers DPM on January 15, 2020 7:10 AM

## 2020-01-15 NOTE — BRIEF OP NOTE
AMPUTATION DIGIT  Progress Note    Isamar Rodriguez  1/15/2020    Pre-op Diagnosis:   Hammer toes of both feet [M20.41, M20.42]  Pain in toes of both feet [M79.674, M79.675]       Post-Op Diagnosis Codes:     * Hammer toes of both feet [M20.41, M20.42]     * Pain in toes of both feet [M79.674, M79.675]    Procedure/CPT® Codes:      Procedure(s):  Fourth toe amputation, second and third hammertoe correction    Surgeon(s):  Darryl Sellers DPM    Anesthesia: Choice    Staff:   Circulator: Lore Gallo RN  Radiology Technologist: Ale Lanier  Scrub Person: Jaclyn Scott  Vendor Representative: Johny Canales  Assistant: Mariya Jasmine MA    Estimated Blood Loss: minimal    Urine Voided: * No values recorded between 1/15/2020  8:23 AM and 1/15/2020  9:56 AM *    Specimens:                Specimens     ID Source Type Tests Collected By Collected At Frozen?      A Toe, Left Tissue · TISSUE PATHOLOGY EXAM   Darryl Sellers DPM 1/15/20 0951      Description: 4th toe    This specimen was not marked as sent.    B Toe, Right Tissue · TISSUE PATHOLOGY EXAM   Darryl Sellers DPM 1/15/20 0951 No     Description: 4th toe    This specimen was not marked as sent.                Drains: * No LDAs found *    Findings: Consistent with diagnosis    Complications: None          This document has been electronically signed by Darryl Sellers DPM on January 15, 2020 10:01 AM     Darryl Sellers DPM     Date: 1/15/2020  Time: 10:00 AM

## 2020-01-19 LAB
LAB AP CASE REPORT: NORMAL
PATH REPORT.FINAL DX SPEC: NORMAL
PATH REPORT.GROSS SPEC: NORMAL

## 2020-01-20 ENCOUNTER — OFFICE VISIT (OUTPATIENT)
Dept: PODIATRY | Facility: CLINIC | Age: 75
End: 2020-01-20

## 2020-01-20 VITALS — WEIGHT: 278 LBS | BODY MASS INDEX: 36.84 KG/M2 | HEIGHT: 73 IN | OXYGEN SATURATION: 94 % | HEART RATE: 84 BPM

## 2020-01-20 DIAGNOSIS — M79.672 PAIN IN BOTH FEET: ICD-10-CM

## 2020-01-20 DIAGNOSIS — M20.42 HAMMER TOES OF BOTH FEET: Primary | ICD-10-CM

## 2020-01-20 DIAGNOSIS — M79.671 PAIN IN BOTH FEET: ICD-10-CM

## 2020-01-20 DIAGNOSIS — M20.41 HAMMER TOES OF BOTH FEET: Primary | ICD-10-CM

## 2020-01-20 PROCEDURE — 99024 POSTOP FOLLOW-UP VISIT: CPT | Performed by: PODIATRIST

## 2020-01-20 NOTE — PROGRESS NOTES
Isamar Rodriguez  1945  74 y.o. male      Patient presents in clinic today for bilateral post op states he is not having any pain   01/20/2020    Chief Complaint   Patient presents with   • Left Foot - Post-op   • Right Foot - Post-op           History of Present Illness    Isamar Rodriguez is a 74 y.o. male who presents for follow-up of bilateral fourth toe amputation, second and third hammertoe correction.  Date of surgery 1/15/2020.  He is doing well overall with minimal postoperative pain.  He is ambulating in surgical shoes without significant issue.    Past Medical History:   Diagnosis Date   • Anxiety and depression    • Arthritis     hands, knees, ankles   • Bilateral foot pain    • Cataract    • Chronic atrial fibrillation    • Concussion 1979   • COPD (chronic obstructive pulmonary disease) (CMS/[facility])    • Hypertension          Past Surgical History:   Procedure Laterality Date   • CARDIAC CATHETERIZATION     • CERVICAL FUSION  2006   • COLONOSCOPY     • ENDOSCOPY     • EYE SURGERY Bilateral     cataracts removed with implants   • FOREARM SURGERY Right    • HERNIA REPAIR Bilateral    • TONSILLECTOMY           History reviewed. No pertinent family history.      Social History     Socioeconomic History   • Marital status:      Spouse name: Not on file   • Number of children: Not on file   • Years of education: Not on file   • Highest education level: Not on file   Tobacco Use   • Smoking status: Current Every Day Smoker     Packs/day: 1.50     Years: 50.00     Pack years: 75.00   • Smokeless tobacco: Never Used   Substance and Sexual Activity   • Alcohol use: Yes     Comment: socially   • Drug use: Never   • Sexual activity: Defer         Current Outpatient Medications   Medication Sig Dispense Refill   • atorvastatin (LIPITOR) 40 MG tablet Take 40 mg by mouth Every Night.     • digoxin (LANOXIN) 125 MCG tablet Take 125 mcg by mouth Every Night.     • enalapril (VASOTEC) 5 MG tablet Take  "5 mg by mouth Every Night.     • HYDROcodone-acetaminophen (NORCO) 7.5-325 MG per tablet Take 1 tablet by mouth Every 6 (Six) Hours As Needed for Moderate Pain . 40 tablet 0   • metoprolol succinate XL (TOPROL-XL) 50 MG 24 hr tablet Take 50 mg by mouth Every Night.     • PROAIR  (90 Base) MCG/ACT inhaler Inhale 2 puffs Every 4 (Four) Hours As Needed for Wheezing or Shortness of Air. 1 inhaler 5   • varenicline (CHANTIX) 1 MG tablet Take 1 mg by mouth Every Night.     • warfarin (COUMADIN) 7.5 MG tablet Take 7.5 mg by mouth Take As Directed.     • zolpidem (AMBIEN) 10 MG tablet Take 1 tablet by mouth At Night As Needed for Sleep. 30 tablet 5     No current facility-administered medications for this visit.          OBJECTIVE    Pulse 84   Ht 185.4 cm (73\")   Wt 126 kg (278 lb)   SpO2 94%   BMI 36.68 kg/m²       Review of Systems   Constitutional: Negative.    HENT: Negative.    Eyes: Negative.    Respiratory: Negative.    Cardiovascular: Negative.    Gastrointestinal: Negative.    Endocrine: Negative.    Genitourinary: Negative.    Musculoskeletal:        Bilateral foot pain    Skin: Negative.    Allergic/Immunologic: Negative.    Neurological: Negative.    Hematological: Negative.    Psychiatric/Behavioral: Negative.        Physical Exam   Constitutional: He is oriented to person, place, and time. He appears well-developed and well-nourished.   HENT:   Head: Normocephalic and atraumatic.   Eyes: Pupils are equal, round, and reactive to light. No scleral icterus.   Neck: Neck supple.   Cardiovascular: Normal rate and regular rhythm.   Pulmonary/Chest: Effort normal and breath sounds normal.   Abdominal: Soft. He exhibits no distension.   Lymphadenopathy:     He has no cervical adenopathy.   Neurological: He is alert and oriented to person, place, and time.   Psychiatric: He has a normal mood and affect. His behavior is normal.             Lower Extremity Exam:  Neurovascular status intact  Bilateral " incisions coapted with sutures in place.  No signs of infection  Bilateral second third toes in improved alignment  Mild edema.  Negative Tam      ASSESSMENT AND PLAN    Isamar was seen today for post-op and post-op.    Diagnoses and all orders for this visit:    Hammer toes of both feet    Pain in both feet      -Doing well postoperatively.  -Dressing change today to be left clean dry intact x1 week  -Continue surgical shoes for all ambulation  -Recheck 1 week, suture removal          This document has been electronically signed by Darryl Sellers DPM on January 20, 2020 12:56 PM     EMR Dragon/Transcription disclaimer:   Much of this encounter note is an electronic transcription/translation of spoken language to printed text. The electronic translation of spoken language may permit erroneous, or at times, nonsensical words or phrases to be inadvertently transcribed; Although I have reviewed the note for such errors, some may still exist.    Darryl Sellers DPM  1/20/2020  12:56 PM

## 2020-01-27 ENCOUNTER — OFFICE VISIT (OUTPATIENT)
Dept: PODIATRY | Facility: CLINIC | Age: 75
End: 2020-01-27

## 2020-01-27 ENCOUNTER — LAB (OUTPATIENT)
Dept: LAB | Facility: OTHER | Age: 75
End: 2020-01-27

## 2020-01-27 VITALS — WEIGHT: 278 LBS | HEIGHT: 73 IN | BODY MASS INDEX: 36.84 KG/M2 | OXYGEN SATURATION: 93 % | HEART RATE: 66 BPM

## 2020-01-27 DIAGNOSIS — I48.91 ATRIAL FIBRILLATION, UNSPECIFIED TYPE (HCC): ICD-10-CM

## 2020-01-27 DIAGNOSIS — M79.671 PAIN IN BOTH FEET: ICD-10-CM

## 2020-01-27 DIAGNOSIS — M79.672 PAIN IN BOTH FEET: ICD-10-CM

## 2020-01-27 DIAGNOSIS — M20.42 HAMMER TOES OF BOTH FEET: Primary | ICD-10-CM

## 2020-01-27 DIAGNOSIS — M20.41 HAMMER TOES OF BOTH FEET: Primary | ICD-10-CM

## 2020-01-27 LAB
INR PPP: 2.4 (ref 0.8–1.2)
PROTHROMBIN TIME: 25.7 SECONDS (ref 11.1–15.3)

## 2020-01-27 PROCEDURE — 36415 COLL VENOUS BLD VENIPUNCTURE: CPT | Performed by: INTERNAL MEDICINE

## 2020-01-27 PROCEDURE — 99024 POSTOP FOLLOW-UP VISIT: CPT | Performed by: PODIATRIST

## 2020-01-27 PROCEDURE — 85610 PROTHROMBIN TIME: CPT | Performed by: INTERNAL MEDICINE

## 2020-01-27 NOTE — PROGRESS NOTES
Isamar Rodriguez  1945  74 y.o. male      Patient presents in clinic today for bilateral post op states he is not having any pain.     01/27/2020        Chief Complaint   Patient presents with   • Left Foot - Post-op   • Right Foot - Post-op           History of Present Illness    Isamar Rodriguez is a 74 y.o. male who presents for follow-up of bilateral fourth toe amputation, second and third hammertoe correction.  Date of surgery 1/15/2020.  He is doing well overall with minimal postoperative pain.  He is ambulating in surgical shoes without significant issue.    Past Medical History:   Diagnosis Date   • Anxiety and depression    • Arthritis     hands, knees, ankles   • Bilateral foot pain    • Cataract    • Chronic atrial fibrillation    • Concussion 1979   • COPD (chronic obstructive pulmonary disease) (CMS/[facility])    • Hypertension          Past Surgical History:   Procedure Laterality Date   • AMPUTATION DIGIT Bilateral 1/15/2020    Procedure: Fourth toe amputation, second and third hammertoe correction;  Surgeon: Darryl Sellers DPM;  Location: [facility];  Service: Podiatry   • CARDIAC CATHETERIZATION     • CERVICAL FUSION  2006   • COLONOSCOPY     • ENDOSCOPY     • EYE SURGERY Bilateral     cataracts removed with implants   • FOREARM SURGERY Right    • HERNIA REPAIR Bilateral    • TONSILLECTOMY           History reviewed. No pertinent family history.      Social History     Socioeconomic History   • Marital status:      Spouse name: Not on file   • Number of children: Not on file   • Years of education: Not on file   • Highest education level: Not on file   Tobacco Use   • Smoking status: Current Every Day Smoker     Packs/day: 1.50     Years: 50.00     Pack years: 75.00   • Smokeless tobacco: Never Used   Substance and Sexual Activity   • Alcohol use: Yes     Comment: socially   • Drug use: Never   • Sexual activity: Defer         Current Outpatient Medications   Medication Sig  "Dispense Refill   • atorvastatin (LIPITOR) 40 MG tablet Take 40 mg by mouth Every Night.     • digoxin (LANOXIN) 125 MCG tablet Take 125 mcg by mouth Every Night.     • enalapril (VASOTEC) 5 MG tablet Take 5 mg by mouth Every Night.     • HYDROcodone-acetaminophen (NORCO) 7.5-325 MG per tablet Take 1 tablet by mouth Every 6 (Six) Hours As Needed for Moderate Pain . 40 tablet 0   • metoprolol succinate XL (TOPROL-XL) 50 MG 24 hr tablet Take 50 mg by mouth Every Night.     • PROAIR  (90 Base) MCG/ACT inhaler Inhale 2 puffs Every 4 (Four) Hours As Needed for Wheezing or Shortness of Air. 1 inhaler 5   • varenicline (CHANTIX) 1 MG tablet Take 1 mg by mouth Every Night.     • warfarin (COUMADIN) 7.5 MG tablet Take 7.5 mg by mouth Take As Directed.     • zolpidem (AMBIEN) 10 MG tablet Take 1 tablet by mouth At Night As Needed for Sleep. 30 tablet 5     No current facility-administered medications for this visit.          OBJECTIVE    Pulse 66   Ht 185.4 cm (73\")   Wt 126 kg (278 lb)   SpO2 93%   BMI 36.68 kg/m²       Review of Systems   Constitutional: Negative.    HENT: Negative.    Eyes: Negative.    Respiratory: Negative.    Cardiovascular: Negative.    Gastrointestinal: Negative.    Endocrine: Negative.    Genitourinary: Negative.    Musculoskeletal:        Bilateral foot pain    Skin: Negative.    Allergic/Immunologic: Negative.    Neurological: Negative.    Hematological: Negative.    Psychiatric/Behavioral: Negative.        Physical Exam   Constitutional: He is oriented to person, place, and time. He appears well-developed and well-nourished.   HENT:   Head: Normocephalic and atraumatic.   Eyes: Pupils are equal, round, and reactive to light. No scleral icterus.   Neck: Neck supple.   Cardiovascular: Normal rate and regular rhythm.   Pulmonary/Chest: Effort normal and breath sounds normal.   Abdominal: Soft. He exhibits no distension.   Lymphadenopathy:     He has no cervical adenopathy.   Neurological: " He is alert and oriented to person, place, and time.   Psychiatric: He has a normal mood and affect. His behavior is normal.             Lower Extremity Exam:  Neurovascular status intact  Bilateral incisions coapted with sutures in place.  No signs of infection  Bilateral second third toes in improved alignment  Mild edema.  Negative Tam      ASSESSMENT AND PLAN    Isamar was seen today for post-op and post-op.    Diagnoses and all orders for this visit:    Hammer toes of both feet  -     XR Foot 3+ View Right    Pain in both feet      -Doing well postoperatively.  -Sutures removed today.  May begin to get incision sites wet.  Continue bandaging as needed.  -May begin transition to regular shoes on the left foot.  Continue surgical shoe on right for now.  -Recheck 2 weeks          This document has been electronically signed by Darryl Sellers DPM on January 27, 2020 1:06 PM     EMR Dragon/Transcription disclaimer:   Much of this encounter note is an electronic transcription/translation of spoken language to printed text. The electronic translation of spoken language may permit erroneous, or at times, nonsensical words or phrases to be inadvertently transcribed; Although I have reviewed the note for such errors, some may still exist.    Darryl Sellers DPM  1/27/2020  1:06 PM

## 2020-02-12 ENCOUNTER — TELEPHONE (OUTPATIENT)
Dept: PODIATRY | Facility: CLINIC | Age: 75
End: 2020-02-12

## 2020-02-27 ENCOUNTER — LAB (OUTPATIENT)
Dept: LAB | Facility: OTHER | Age: 75
End: 2020-02-27

## 2020-02-27 DIAGNOSIS — I48.91 ATRIAL FIBRILLATION, UNSPECIFIED TYPE (HCC): ICD-10-CM

## 2020-02-27 LAB
INR PPP: 4.84 (ref 0.8–1.2)
PROTHROMBIN TIME: 44.7 SECONDS (ref 11.1–15.3)

## 2020-02-27 PROCEDURE — 36415 COLL VENOUS BLD VENIPUNCTURE: CPT | Performed by: INTERNAL MEDICINE

## 2020-02-27 PROCEDURE — 85610 PROTHROMBIN TIME: CPT | Performed by: INTERNAL MEDICINE

## 2020-05-26 ENCOUNTER — TRANSCRIBE ORDERS (OUTPATIENT)
Dept: GENERAL RADIOLOGY | Facility: CLINIC | Age: 75
End: 2020-05-26

## 2020-05-26 DIAGNOSIS — R06.00 DYSPNEA, UNSPECIFIED TYPE: Primary | ICD-10-CM

## 2020-08-06 ENCOUNTER — LAB (OUTPATIENT)
Dept: LAB | Facility: OTHER | Age: 75
End: 2020-08-06

## 2020-08-06 PROCEDURE — U0002 COVID-19 LAB TEST NON-CDC: HCPCS | Performed by: NURSE PRACTITIONER

## 2021-07-10 ENCOUNTER — HOSPITAL ENCOUNTER (INPATIENT)
Facility: HOSPITAL | Age: 76
LOS: 3 days | Discharge: HOME | End: 2021-07-13
Attending: INTERNAL MEDICINE | Admitting: INTERNAL MEDICINE

## 2021-07-10 DIAGNOSIS — Z74.09 IMPAIRED MOBILITY: ICD-10-CM

## 2021-07-10 DIAGNOSIS — I73.9 PERIPHERAL VASCULAR DISEASE (HCC): ICD-10-CM

## 2021-07-10 DIAGNOSIS — J96.01 ACUTE RESPIRATORY FAILURE WITH HYPOXIA (HCC): Primary | ICD-10-CM

## 2021-07-10 PROBLEM — I10 ESSENTIAL HYPERTENSION: Status: ACTIVE | Noted: 2021-07-10

## 2021-07-10 PROBLEM — J44.9 COPD (CHRONIC OBSTRUCTIVE PULMONARY DISEASE) (HCC): Status: ACTIVE | Noted: 2021-07-10

## 2021-07-10 PROBLEM — R91.8 PULMONARY INFILTRATE: Status: ACTIVE | Noted: 2021-07-10

## 2021-07-10 PROBLEM — E66.9 OBESITY (BMI 30-39.9): Status: ACTIVE | Noted: 2021-07-10

## 2021-07-10 PROBLEM — I48.91 ATRIAL FIBRILLATION (HCC): Status: ACTIVE | Noted: 2021-07-10

## 2021-07-10 PROBLEM — F17.200 TOBACCO DEPENDENCE: Status: ACTIVE | Noted: 2021-07-10

## 2021-07-10 PROBLEM — I31.39 PERICARDIAL EFFUSION: Status: ACTIVE | Noted: 2021-07-10

## 2021-07-10 PROBLEM — R09.02 HYPOXEMIA: Status: ACTIVE | Noted: 2021-07-10

## 2021-07-10 LAB
ANION GAP SERPL CALCULATED.3IONS-SCNC: 11 MMOL/L (ref 5–15)
B PARAPERT DNA SPEC QL NAA+PROBE: NOT DETECTED
B PERT DNA SPEC QL NAA+PROBE: NOT DETECTED
BASOPHILS # BLD AUTO: 0.02 10*3/MM3 (ref 0–0.2)
BASOPHILS NFR BLD AUTO: 0.2 % (ref 0–1.5)
BUN SERPL-MCNC: 22 MG/DL (ref 8–23)
BUN/CREAT SERPL: 17.3 (ref 7–25)
C PNEUM DNA NPH QL NAA+NON-PROBE: NOT DETECTED
CALCIUM SPEC-SCNC: 8.8 MG/DL (ref 8.6–10.5)
CHLORIDE SERPL-SCNC: 100 MMOL/L (ref 98–107)
CO2 SERPL-SCNC: 25 MMOL/L (ref 22–29)
CREAT SERPL-MCNC: 1.27 MG/DL (ref 0.76–1.27)
DEPRECATED RDW RBC AUTO: 57.7 FL (ref 37–54)
EOSINOPHIL # BLD AUTO: 0 10*3/MM3 (ref 0–0.4)
EOSINOPHIL NFR BLD AUTO: 0 % (ref 0.3–6.2)
ERYTHROCYTE [DISTWIDTH] IN BLOOD BY AUTOMATED COUNT: 15.7 % (ref 12.3–15.4)
FLUAV SUBTYP SPEC NAA+PROBE: NOT DETECTED
FLUBV RNA ISLT QL NAA+PROBE: NOT DETECTED
GFR SERPL CREATININE-BSD FRML MDRD: 55 ML/MIN/1.73
GLUCOSE SERPL-MCNC: 311 MG/DL (ref 65–99)
HADV DNA SPEC NAA+PROBE: NOT DETECTED
HCOV 229E RNA SPEC QL NAA+PROBE: NOT DETECTED
HCOV HKU1 RNA SPEC QL NAA+PROBE: NOT DETECTED
HCOV NL63 RNA SPEC QL NAA+PROBE: NOT DETECTED
HCOV OC43 RNA SPEC QL NAA+PROBE: NOT DETECTED
HCT VFR BLD AUTO: 37.3 % (ref 37.5–51)
HGB BLD-MCNC: 11.6 G/DL (ref 13–17.7)
HMPV RNA NPH QL NAA+NON-PROBE: NOT DETECTED
HPIV1 RNA SPEC QL NAA+PROBE: NOT DETECTED
HPIV2 RNA SPEC QL NAA+PROBE: NOT DETECTED
HPIV3 RNA NPH QL NAA+PROBE: NOT DETECTED
HPIV4 P GENE NPH QL NAA+PROBE: NOT DETECTED
IMM GRANULOCYTES # BLD AUTO: 0.07 10*3/MM3 (ref 0–0.05)
IMM GRANULOCYTES NFR BLD AUTO: 0.7 % (ref 0–0.5)
LYMPHOCYTES # BLD AUTO: 0.29 10*3/MM3 (ref 0.7–3.1)
LYMPHOCYTES NFR BLD AUTO: 2.8 % (ref 19.6–45.3)
M PNEUMO IGG SER IA-ACNC: NOT DETECTED
MAGNESIUM SERPL-MCNC: 2.3 MG/DL (ref 1.6–2.4)
MCH RBC QN AUTO: 31.8 PG (ref 26.6–33)
MCHC RBC AUTO-ENTMCNC: 31.1 G/DL (ref 31.5–35.7)
MCV RBC AUTO: 102.2 FL (ref 79–97)
MONOCYTES # BLD AUTO: 0.1 10*3/MM3 (ref 0.1–0.9)
MONOCYTES NFR BLD AUTO: 1 % (ref 5–12)
NEUTROPHILS NFR BLD AUTO: 9.75 10*3/MM3 (ref 1.7–7)
NEUTROPHILS NFR BLD AUTO: 95.3 % (ref 42.7–76)
NRBC BLD AUTO-RTO: 0 /100 WBC (ref 0–0.2)
NT-PROBNP SERPL-MCNC: 2483 PG/ML (ref 0–1800)
PLATELET # BLD AUTO: 227 10*3/MM3 (ref 140–450)
PMV BLD AUTO: 10.5 FL (ref 6–12)
POTASSIUM SERPL-SCNC: 5.1 MMOL/L (ref 3.5–5.2)
RBC # BLD AUTO: 3.65 10*6/MM3 (ref 4.14–5.8)
RHINOVIRUS RNA SPEC NAA+PROBE: NOT DETECTED
RSV RNA NPH QL NAA+NON-PROBE: NOT DETECTED
SODIUM SERPL-SCNC: 136 MMOL/L (ref 136–145)
WBC # BLD AUTO: 10.23 10*3/MM3 (ref 3.4–10.8)

## 2021-07-10 PROCEDURE — 25010000002 METHYLPREDNISOLONE PER 40 MG: Performed by: INTERNAL MEDICINE

## 2021-07-10 PROCEDURE — 25010000002 CEFTRIAXONE PER 250 MG: Performed by: INTERNAL MEDICINE

## 2021-07-10 PROCEDURE — 25010000002 DIGOXIN PER 500 MCG: Performed by: INTERNAL MEDICINE

## 2021-07-10 PROCEDURE — 87633 RESP VIRUS 12-25 TARGETS: CPT | Performed by: INTERNAL MEDICINE

## 2021-07-10 PROCEDURE — 85025 COMPLETE CBC W/AUTO DIFF WBC: CPT | Performed by: INTERNAL MEDICINE

## 2021-07-10 PROCEDURE — 94799 UNLISTED PULMONARY SVC/PX: CPT

## 2021-07-10 PROCEDURE — 83735 ASSAY OF MAGNESIUM: CPT | Performed by: INTERNAL MEDICINE

## 2021-07-10 PROCEDURE — 83880 ASSAY OF NATRIURETIC PEPTIDE: CPT | Performed by: INTERNAL MEDICINE

## 2021-07-10 PROCEDURE — 93010 ELECTROCARDIOGRAM REPORT: CPT | Performed by: INTERNAL MEDICINE

## 2021-07-10 PROCEDURE — 93005 ELECTROCARDIOGRAM TRACING: CPT | Performed by: INTERNAL MEDICINE

## 2021-07-10 PROCEDURE — 80048 BASIC METABOLIC PNL TOTAL CA: CPT | Performed by: INTERNAL MEDICINE

## 2021-07-10 RX ORDER — FUROSEMIDE 20 MG/1
20 TABLET ORAL DAILY
Status: DISCONTINUED | OUTPATIENT
Start: 2021-07-10 | End: 2021-07-11

## 2021-07-10 RX ORDER — DIGOXIN 0.25 MG/ML
500 INJECTION INTRAMUSCULAR; INTRAVENOUS ONCE
Status: COMPLETED | OUTPATIENT
Start: 2021-07-10 | End: 2021-07-10

## 2021-07-10 RX ORDER — NICOTINE 21 MG/24HR
1 PATCH, TRANSDERMAL 24 HOURS TRANSDERMAL
Status: DISCONTINUED | OUTPATIENT
Start: 2021-07-10 | End: 2021-07-13 | Stop reason: HOSPADM

## 2021-07-10 RX ORDER — IPRATROPIUM BROMIDE AND ALBUTEROL SULFATE 2.5; .5 MG/3ML; MG/3ML
3 SOLUTION RESPIRATORY (INHALATION)
Status: DISCONTINUED | OUTPATIENT
Start: 2021-07-10 | End: 2021-07-11

## 2021-07-10 RX ORDER — DIGOXIN 125 MCG
125 TABLET ORAL
Status: DISCONTINUED | OUTPATIENT
Start: 2021-07-11 | End: 2021-07-13 | Stop reason: HOSPADM

## 2021-07-10 RX ORDER — DOXYCYCLINE 100 MG/1
100 TABLET ORAL EVERY 12 HOURS SCHEDULED
Status: DISCONTINUED | OUTPATIENT
Start: 2021-07-10 | End: 2021-07-13 | Stop reason: HOSPADM

## 2021-07-10 RX ORDER — ONDANSETRON 2 MG/ML
4 INJECTION INTRAMUSCULAR; INTRAVENOUS EVERY 6 HOURS PRN
Status: DISCONTINUED | OUTPATIENT
Start: 2021-07-10 | End: 2021-07-13 | Stop reason: HOSPADM

## 2021-07-10 RX ORDER — FUROSEMIDE 20 MG/1
20 TABLET ORAL DAILY
COMMUNITY

## 2021-07-10 RX ORDER — METOPROLOL TARTRATE 50 MG/1
50 TABLET, FILM COATED ORAL EVERY 12 HOURS SCHEDULED
Status: DISCONTINUED | OUTPATIENT
Start: 2021-07-10 | End: 2021-07-13 | Stop reason: HOSPADM

## 2021-07-10 RX ORDER — GUAIFENESIN 600 MG/1
1200 TABLET, EXTENDED RELEASE ORAL EVERY 12 HOURS SCHEDULED
Status: DISCONTINUED | OUTPATIENT
Start: 2021-07-10 | End: 2021-07-13 | Stop reason: HOSPADM

## 2021-07-10 RX ORDER — ACETAMINOPHEN 325 MG/1
650 TABLET ORAL EVERY 4 HOURS PRN
Status: DISCONTINUED | OUTPATIENT
Start: 2021-07-10 | End: 2021-07-13 | Stop reason: HOSPADM

## 2021-07-10 RX ORDER — VENLAFAXINE HYDROCHLORIDE 75 MG/1
75 CAPSULE, EXTENDED RELEASE ORAL DAILY
COMMUNITY

## 2021-07-10 RX ORDER — METHYLPREDNISOLONE SODIUM SUCCINATE 40 MG/ML
40 INJECTION, POWDER, LYOPHILIZED, FOR SOLUTION INTRAMUSCULAR; INTRAVENOUS EVERY 8 HOURS
Status: DISCONTINUED | OUTPATIENT
Start: 2021-07-10 | End: 2021-07-12

## 2021-07-10 RX ORDER — VENLAFAXINE HYDROCHLORIDE 75 MG/1
75 CAPSULE, EXTENDED RELEASE ORAL DAILY
Status: DISCONTINUED | OUTPATIENT
Start: 2021-07-10 | End: 2021-07-13 | Stop reason: HOSPADM

## 2021-07-10 RX ORDER — SODIUM CHLORIDE 0.9 % (FLUSH) 0.9 %
10 SYRINGE (ML) INJECTION AS NEEDED
Status: DISCONTINUED | OUTPATIENT
Start: 2021-07-10 | End: 2021-07-13 | Stop reason: HOSPADM

## 2021-07-10 RX ORDER — FAMOTIDINE 20 MG/1
20 TABLET, FILM COATED ORAL
Status: DISCONTINUED | OUTPATIENT
Start: 2021-07-10 | End: 2021-07-13 | Stop reason: HOSPADM

## 2021-07-10 RX ORDER — SODIUM CHLORIDE 0.9 % (FLUSH) 0.9 %
10 SYRINGE (ML) INJECTION EVERY 12 HOURS SCHEDULED
Status: DISCONTINUED | OUTPATIENT
Start: 2021-07-10 | End: 2021-07-13 | Stop reason: HOSPADM

## 2021-07-10 RX ORDER — TAMSULOSIN HYDROCHLORIDE 0.4 MG/1
0.4 CAPSULE ORAL DAILY
Status: DISCONTINUED | OUTPATIENT
Start: 2021-07-11 | End: 2021-07-13 | Stop reason: HOSPADM

## 2021-07-10 RX ORDER — ATORVASTATIN CALCIUM 40 MG/1
40 TABLET, FILM COATED ORAL NIGHTLY
Status: DISCONTINUED | OUTPATIENT
Start: 2021-07-10 | End: 2021-07-13 | Stop reason: HOSPADM

## 2021-07-10 RX ORDER — ENALAPRIL MALEATE 5 MG/1
5 TABLET ORAL NIGHTLY
Status: DISCONTINUED | OUTPATIENT
Start: 2021-07-10 | End: 2021-07-13 | Stop reason: HOSPADM

## 2021-07-10 RX ADMIN — DIGOXIN 500 MCG: 0.25 INJECTION INTRAMUSCULAR; INTRAVENOUS at 22:31

## 2021-07-10 RX ADMIN — VENLAFAXINE HYDROCHLORIDE 75 MG: 75 CAPSULE, EXTENDED RELEASE ORAL at 18:07

## 2021-07-10 RX ADMIN — CEFTRIAXONE SODIUM 1 G: 1 INJECTION, POWDER, FOR SOLUTION INTRAMUSCULAR; INTRAVENOUS at 18:03

## 2021-07-10 RX ADMIN — FAMOTIDINE 20 MG: 20 TABLET, FILM COATED ORAL at 18:07

## 2021-07-10 RX ADMIN — NICOTINE 1 PATCH: 21 PATCH, EXTENDED RELEASE TRANSDERMAL at 18:07

## 2021-07-10 RX ADMIN — FUROSEMIDE 20 MG: 20 TABLET ORAL at 18:08

## 2021-07-10 RX ADMIN — SODIUM CHLORIDE 500 ML: 0.9 INJECTION, SOLUTION INTRAVENOUS at 22:29

## 2021-07-10 RX ADMIN — SODIUM CHLORIDE, PRESERVATIVE FREE 10 ML: 5 INJECTION INTRAVENOUS at 22:30

## 2021-07-10 RX ADMIN — METOPROLOL TARTRATE 50 MG: 50 TABLET, FILM COATED ORAL at 22:29

## 2021-07-10 RX ADMIN — METHYLPREDNISOLONE SODIUM SUCCINATE 40 MG: 40 INJECTION, POWDER, LYOPHILIZED, FOR SOLUTION INTRAMUSCULAR; INTRAVENOUS at 18:14

## 2021-07-10 RX ADMIN — ACETAMINOPHEN 650 MG: 325 TABLET, FILM COATED ORAL at 22:28

## 2021-07-10 RX ADMIN — IPRATROPIUM BROMIDE AND ALBUTEROL SULFATE 3 ML: 2.5; .5 SOLUTION RESPIRATORY (INHALATION) at 20:32

## 2021-07-10 RX ADMIN — GUAIFENESIN 1200 MG: 600 TABLET, EXTENDED RELEASE ORAL at 22:28

## 2021-07-10 RX ADMIN — RIVAROXABAN 20 MG: 20 TABLET, FILM COATED ORAL at 18:08

## 2021-07-10 NOTE — H&P
"    [facility] Medicine Services  HISTORY AND PHYSICAL    Date of Admission: 7/10/2021  Primary Care Physician: Juvenal Wynn MD    Subjective     Chief Complaint: Transfer from [facility] due to hypoxemia, pericardial effusion    History of Present Illness  Patient is a 75-year-old  male with past medical history significant for atrial fibrillation on outpatient anticoagulation with Xarelto, chronic obstructive pulmonary disease, in addition to tobacco dependence that presented to our hospital as a transfer from [facility] secondary to hypoxemia, pericardial effusion, and A. fib with RVR.  Patient states that he has not felt well in months.  Over the past couple of days he has had worsening shortness of breath, and per the conversation with the emergency department physician at the outside facility he reportedly had O2 saturations that were in the 80s upon arrival to their ED.  Patient does report that he smokes 2 packs of cigarettes per day.  He does carry diagnosis of COPD.  He reports that his cough recently has been dry.  He denies any chest pain or chest pressure.  He states that he has been very weak, and constantly feels like he is \"dragging.\"  Denies any significant fevers or chills.  Has had some lower extremity edema which he states occasionally can be asymmetric, however he cannot recall which leg typically swells more than the other.  He does report some occasional wheezing.  He reports that he was getting ready to have an appointment with a specialist in [place] regarding a pericardial effusion he was recently diagnosed with.  He denies paroxysmal nocturnal dyspnea and orthopnea.  Patient does report that he had recently been on a trial of steroids as an outpatient.    Review of Systems     Otherwise complete ROS reviewed and negative except as mentioned in the HPI.    Past Medical History:   Past Medical History:   Diagnosis Date   • Anxiety and depression "    • Arthritis     hands, knees, ankles   • Bilateral foot pain    • Cataract    • Chronic atrial fibrillation (CMS/HCC)    • Concussion 1979   • COPD (chronic obstructive pulmonary disease) (CMS/HCC)    • Hypertension      Past Surgical History:  Past Surgical History:   Procedure Laterality Date   • AMPUTATION DIGIT Bilateral 1/15/2020    Procedure: Fourth toe amputation, second and third hammertoe correction;  Surgeon: Darryl Sellers DPM;  Location: [facility];  Service: Podiatry   • CARDIAC CATHETERIZATION     • CERVICAL FUSION  2006   • COLONOSCOPY     • ENDOSCOPY     • EYE SURGERY Bilateral     cataracts removed with implants   • FOREARM SURGERY Right    • HERNIA REPAIR Bilateral    • TONSILLECTOMY       Social History:  reports that he has been smoking. He has a 75.00 pack-year smoking history. He has never used smokeless tobacco. He reports current alcohol use. He reports that he does not use drugs.    Family History: Reports that his mother had bone cancer, and his father had dementia.    Allergies:  Allergies   Allergen Reactions   • Codeine Itching     Itching  Pt denies allergy to codeine. 2/17/16 JIMY Engle   • Morphine Itching     itching   • Propoxyphene Itching       Medications:  Prior to Admission medications    Medication Sig Start Date End Date Taking? Authorizing Provider   furosemide (LASIX) 20 MG tablet Take 20 mg by mouth Daily.   Yes ProviderCharlette MD   rivaroxaban (Xarelto) 20 MG tablet Take 20 mg by mouth. 5/26/20  Yes Emergency, Nurse JIMY Arora   tamsulosin (FLOMAX) 0.4 MG capsule 24 hr capsule Take 0.4 mg by mouth Daily. 5/26/20  Yes Emergency, Nurse JIMY Arora   tiotropium bromide-olodaterol (STIOLTO RESPIMAT) 2.5-2.5 MCG/ACT aerosol solution inhaler Inhale Daily.   Yes ProviderCharlette MD   venlafaxine XR (EFFEXOR-XR) 75 MG 24 hr capsule Take 75 mg by mouth Daily.   Yes ProviderCharlette MD   zolpidem (AMBIEN) 10 MG tablet Take 1 tablet by mouth At Night As Needed for  "Sleep. 10/2/18  Yes Humphries, BOSTON Llamas   atorvastatin (LIPITOR) 40 MG tablet Take 40 mg by mouth. 5/4/20   Emergency, Nurse Maite RN   digoxin (LANOXIN) 125 MCG tablet Take 125 mcg by mouth Every Night.    Provider, MD Charlette   enalapril (VASOTEC) 5 MG tablet Take 5 mg by mouth Every Night.    Provider, MD Charlette   metoprolol tartrate (LOPRESSOR) 50 MG tablet 50 mg 2 (Two) Times a Day. 5/4/20   Emergency, Nurse JIMY Arora   metoprolol succinate XL (TOPROL-XL) 50 MG 24 hr tablet Take 50 mg by mouth Every Night.  7/10/21  Provider, MD Charlette   PROAIR  (90 Base) MCG/ACT inhaler Inhale 2 puffs Every 4 (Four) Hours As Needed for Wheezing or Shortness of Air. 9/18/18 7/10/21  Humphries, BOSTON Llamas     I have utilized all available immediate resources to obtain, update, and review the patient's current medications.    Objective     Vital Signs: /69 (BP Location: Right arm, Patient Position: Lying)   Pulse 90   Temp 98.1 °F (36.7 °C) (Oral)   Resp 20   Ht 185.4 cm (72.99\")   Wt 120 kg (265 lb 3.2 oz)   SpO2 92%   BMI 35.00 kg/m²   Physical Exam  Vitals reviewed.   Constitutional:       Appearance: He is ill-appearing.   HENT:      Head: Normocephalic.      Mouth/Throat:      Pharynx: No oropharyngeal exudate.   Eyes:      Pupils: Pupils are equal, round, and reactive to light.   Cardiovascular:      Rate and Rhythm: Normal rate. Rhythm irregular.      Heart sounds: No friction rub.   Pulmonary:      Effort: Pulmonary effort is normal.      Breath sounds: Wheezing (faint) present. No rhonchi or rales.      Comments: On supp 02 via nasal cannula  Abdominal:      Palpations: Abdomen is soft.   Musculoskeletal:      Cervical back: Neck supple.      Right lower leg: Edema present.      Left lower leg: Edema present.   Skin:     General: Skin is warm.      Capillary Refill: Capillary refill takes less than 2 seconds.   Neurological:      General: No focal deficit present.      Mental " Status: He is alert.      Motor: Weakness present.   Psychiatric:         Mood and Affect: Mood normal.          Results Reviewed:  Lab Results (last 24 hours)     ** No results found for the last 24 hours. **        Imaging Results (Last 24 Hours)     ** No results found for the last 24 hours. **        I have personally reviewed and interpreted the radiology studies and ECG obtained at time of admission.     CT scan of the chest from the outside hospital with the following read: No central filling defects to suggest pulmonary embolus.  Scattered borderline enlarged nodes in the mediastinum.  Significant cardiac enlargement with a moderate-sized pericardial effusion.  At its greatest dimension the effusion measures approximately 1.5 cm.  There are basilar areas of atelectasis with no definite areas of consolidation.  There are no pleural effusions.  This study was completed on June 6    CT scan of the abdomen and pelvis revealed no acute intra-abdominal or intrapelvic abnormalities    CT scan of the head with no acute abnormality    D-dimer 146 (0-600), white blood cell count 12.8, hemoglobin 15.2, platelets 163, Covid test negative, proBNP 2249, TSH 1.35, glucose 180, BUN 16, creatinine 1.37, sodium 138, potassium 4.5, chloride 100, bicarbonate 30, liver function tests were largely unremarkable, INR 1.3    CT scan of the chest performed today reveals the following: No pulmonary embolism.  Large pericardial effusion.  Patchy groundglass infiltrates in both lungs and small left pleural effusion.  Differential includes pulmonary edema or atypical pneumonia.    Assessment / Plan     Assessment:   Active Hospital Problems    Diagnosis    • Pericardial effusion    • Tobacco dependence    • COPD (chronic obstructive pulmonary disease) (CMS/HCC)    • Atrial fibrillation (CMS/HCC)    • Essential hypertension    • Pulmonary infiltrate    • Obesity (BMI 30-39.9)    • Hypoxemia      Plan:   1.  IV Rocephin and p.o. doxy  2.   Solu-Medrol 40 mg IV every 8 hours  3.  Scheduled DuoNeb  4.  Supplemental oxygen  5.  Echocardiogram to evaluate pericardial effusion  6.  Continuous telemetry and pulse oximetry  7.  Resume outpatient rate controlling medications for atrial fibrillation; continue Xarelto  8.  Tobacco cessation counseling  9.  Nicotine replacement therapy  10.  Respiratory PCR panel  11.  Check digoxin level  12.  P.o. Lasix for now  13.  Incentive spirometry  14.  Check orthostatic blood pressures  15.  CBC, CMP, digoxin level, BNP, procalcitonin in the morning tomorrow.      Code Status/Advanced Care Plan: Full Code    The patient's surrogate decision maker is his daughter, Joellen Rosen      Electronically signed by Adam Tay MD, 07/10/21, 17:18 CDT.

## 2021-07-11 ENCOUNTER — APPOINTMENT (OUTPATIENT)
Dept: GENERAL RADIOLOGY | Facility: HOSPITAL | Age: 76
End: 2021-07-11

## 2021-07-11 ENCOUNTER — APPOINTMENT (OUTPATIENT)
Dept: CARDIOLOGY | Facility: HOSPITAL | Age: 76
End: 2021-07-11

## 2021-07-11 LAB
ALBUMIN SERPL-MCNC: 3.6 G/DL (ref 3.5–5.2)
ALBUMIN/GLOB SERPL: 1.3 G/DL
ALP SERPL-CCNC: 55 U/L (ref 39–117)
ALT SERPL W P-5'-P-CCNC: 8 U/L (ref 1–41)
ANION GAP SERPL CALCULATED.3IONS-SCNC: 10 MMOL/L (ref 5–15)
AST SERPL-CCNC: 11 U/L (ref 1–40)
BASOPHILS # BLD AUTO: 0.01 10*3/MM3 (ref 0–0.2)
BASOPHILS NFR BLD AUTO: 0.1 % (ref 0–1.5)
BILIRUB SERPL-MCNC: 0.7 MG/DL (ref 0–1.2)
BUN SERPL-MCNC: 25 MG/DL (ref 8–23)
BUN/CREAT SERPL: 18 (ref 7–25)
CALCIUM SPEC-SCNC: 8.4 MG/DL (ref 8.6–10.5)
CHLORIDE SERPL-SCNC: 100 MMOL/L (ref 98–107)
CO2 SERPL-SCNC: 26 MMOL/L (ref 22–29)
CREAT SERPL-MCNC: 1.39 MG/DL (ref 0.76–1.27)
DEPRECATED RDW RBC AUTO: 57.7 FL (ref 37–54)
DIGOXIN SERPL-MCNC: 1.5 NG/ML (ref 0.6–1.2)
EOSINOPHIL # BLD AUTO: 0 10*3/MM3 (ref 0–0.4)
EOSINOPHIL NFR BLD AUTO: 0 % (ref 0.3–6.2)
ERYTHROCYTE [DISTWIDTH] IN BLOOD BY AUTOMATED COUNT: 15.7 % (ref 12.3–15.4)
GFR SERPL CREATININE-BSD FRML MDRD: 50 ML/MIN/1.73
GLOBULIN UR ELPH-MCNC: 2.8 GM/DL
GLUCOSE SERPL-MCNC: 395 MG/DL (ref 65–99)
HBA1C MFR BLD: 7.8 % (ref 4.8–5.6)
HCT VFR BLD AUTO: 35.2 % (ref 37.5–51)
HGB BLD-MCNC: 11.1 G/DL (ref 13–17.7)
IMM GRANULOCYTES # BLD AUTO: 0.07 10*3/MM3 (ref 0–0.05)
IMM GRANULOCYTES NFR BLD AUTO: 0.6 % (ref 0–0.5)
LYMPHOCYTES # BLD AUTO: 0.26 10*3/MM3 (ref 0.7–3.1)
LYMPHOCYTES NFR BLD AUTO: 2.1 % (ref 19.6–45.3)
MCH RBC QN AUTO: 32.2 PG (ref 26.6–33)
MCHC RBC AUTO-ENTMCNC: 31.5 G/DL (ref 31.5–35.7)
MCV RBC AUTO: 102 FL (ref 79–97)
MONOCYTES # BLD AUTO: 0.21 10*3/MM3 (ref 0.1–0.9)
MONOCYTES NFR BLD AUTO: 1.7 % (ref 5–12)
NEUTROPHILS NFR BLD AUTO: 11.99 10*3/MM3 (ref 1.7–7)
NEUTROPHILS NFR BLD AUTO: 95.5 % (ref 42.7–76)
NRBC BLD AUTO-RTO: 0 /100 WBC (ref 0–0.2)
PLATELET # BLD AUTO: 201 10*3/MM3 (ref 140–450)
PMV BLD AUTO: 10.7 FL (ref 6–12)
POTASSIUM SERPL-SCNC: 5.1 MMOL/L (ref 3.5–5.2)
PROCALCITONIN SERPL-MCNC: 0.06 NG/ML (ref 0–0.25)
PROT SERPL-MCNC: 6.4 G/DL (ref 6–8.5)
RBC # BLD AUTO: 3.45 10*6/MM3 (ref 4.14–5.8)
SODIUM SERPL-SCNC: 136 MMOL/L (ref 136–145)
WBC # BLD AUTO: 12.54 10*3/MM3 (ref 3.4–10.8)

## 2021-07-11 PROCEDURE — 80162 ASSAY OF DIGOXIN TOTAL: CPT | Performed by: INTERNAL MEDICINE

## 2021-07-11 PROCEDURE — 99222 1ST HOSP IP/OBS MODERATE 55: CPT | Performed by: INTERNAL MEDICINE

## 2021-07-11 PROCEDURE — 94799 UNLISTED PULMONARY SVC/PX: CPT

## 2021-07-11 PROCEDURE — 25010000002 METHYLPREDNISOLONE PER 40 MG: Performed by: INTERNAL MEDICINE

## 2021-07-11 PROCEDURE — 80053 COMPREHEN METABOLIC PANEL: CPT | Performed by: INTERNAL MEDICINE

## 2021-07-11 PROCEDURE — 85025 COMPLETE CBC W/AUTO DIFF WBC: CPT | Performed by: INTERNAL MEDICINE

## 2021-07-11 PROCEDURE — 25010000002 PERFLUTREN 6.52 MG/ML SUSPENSION: Performed by: INTERNAL MEDICINE

## 2021-07-11 PROCEDURE — 93306 TTE W/DOPPLER COMPLETE: CPT

## 2021-07-11 PROCEDURE — 83036 HEMOGLOBIN GLYCOSYLATED A1C: CPT | Performed by: NURSE PRACTITIONER

## 2021-07-11 PROCEDURE — 84145 PROCALCITONIN (PCT): CPT | Performed by: INTERNAL MEDICINE

## 2021-07-11 PROCEDURE — 25010000002 CEFTRIAXONE PER 250 MG: Performed by: INTERNAL MEDICINE

## 2021-07-11 PROCEDURE — 71045 X-RAY EXAM CHEST 1 VIEW: CPT

## 2021-07-11 PROCEDURE — 25010000002 LORAZEPAM PER 2 MG: Performed by: INTERNAL MEDICINE

## 2021-07-11 PROCEDURE — 93306 TTE W/DOPPLER COMPLETE: CPT | Performed by: INTERNAL MEDICINE

## 2021-07-11 RX ORDER — LORAZEPAM 1 MG/1
1 TABLET ORAL
Status: DISCONTINUED | OUTPATIENT
Start: 2021-07-11 | End: 2021-07-13 | Stop reason: HOSPADM

## 2021-07-11 RX ORDER — LORAZEPAM 2 MG/ML
0.5 INJECTION INTRAMUSCULAR
Status: DISCONTINUED | OUTPATIENT
Start: 2021-07-11 | End: 2021-07-13 | Stop reason: HOSPADM

## 2021-07-11 RX ORDER — LORAZEPAM 2 MG/ML
1 INJECTION INTRAMUSCULAR
Status: DISCONTINUED | OUTPATIENT
Start: 2021-07-11 | End: 2021-07-13 | Stop reason: HOSPADM

## 2021-07-11 RX ORDER — CYCLOBENZAPRINE HCL 10 MG
5 TABLET ORAL 3 TIMES DAILY PRN
Status: DISCONTINUED | OUTPATIENT
Start: 2021-07-11 | End: 2021-07-13 | Stop reason: HOSPADM

## 2021-07-11 RX ORDER — LORAZEPAM 0.5 MG/1
0.5 TABLET ORAL
Status: DISCONTINUED | OUTPATIENT
Start: 2021-07-11 | End: 2021-07-13 | Stop reason: HOSPADM

## 2021-07-11 RX ADMIN — METHYLPREDNISOLONE SODIUM SUCCINATE 40 MG: 40 INJECTION, POWDER, LYOPHILIZED, FOR SOLUTION INTRAMUSCULAR; INTRAVENOUS at 01:17

## 2021-07-11 RX ADMIN — PERFLUTREN 1.17 MG: 6.52 INJECTION, SUSPENSION INTRAVENOUS at 16:16

## 2021-07-11 RX ADMIN — METOPROLOL TARTRATE 50 MG: 50 TABLET, FILM COATED ORAL at 09:14

## 2021-07-11 RX ADMIN — METHYLPREDNISOLONE SODIUM SUCCINATE 40 MG: 40 INJECTION, POWDER, LYOPHILIZED, FOR SOLUTION INTRAMUSCULAR; INTRAVENOUS at 09:14

## 2021-07-11 RX ADMIN — IPRATROPIUM BROMIDE AND ALBUTEROL SULFATE 3 ML: 2.5; .5 SOLUTION RESPIRATORY (INHALATION) at 14:09

## 2021-07-11 RX ADMIN — METHYLPREDNISOLONE SODIUM SUCCINATE 40 MG: 40 INJECTION, POWDER, LYOPHILIZED, FOR SOLUTION INTRAMUSCULAR; INTRAVENOUS at 17:14

## 2021-07-11 RX ADMIN — SODIUM CHLORIDE, PRESERVATIVE FREE 10 ML: 5 INJECTION INTRAVENOUS at 21:37

## 2021-07-11 RX ADMIN — CYCLOBENZAPRINE 5 MG: 10 TABLET, FILM COATED ORAL at 21:34

## 2021-07-11 RX ADMIN — TAMSULOSIN HYDROCHLORIDE 0.4 MG: 0.4 CAPSULE ORAL at 09:14

## 2021-07-11 RX ADMIN — GUAIFENESIN 1200 MG: 600 TABLET, EXTENDED RELEASE ORAL at 21:34

## 2021-07-11 RX ADMIN — FAMOTIDINE 20 MG: 20 TABLET, FILM COATED ORAL at 17:14

## 2021-07-11 RX ADMIN — IPRATROPIUM BROMIDE AND ALBUTEROL SULFATE 3 ML: 2.5; .5 SOLUTION RESPIRATORY (INHALATION) at 06:31

## 2021-07-11 RX ADMIN — VENLAFAXINE HYDROCHLORIDE 75 MG: 75 CAPSULE, EXTENDED RELEASE ORAL at 09:14

## 2021-07-11 RX ADMIN — GUAIFENESIN 1200 MG: 600 TABLET, EXTENDED RELEASE ORAL at 09:14

## 2021-07-11 RX ADMIN — SODIUM CHLORIDE, PRESERVATIVE FREE 10 ML: 5 INJECTION INTRAVENOUS at 09:15

## 2021-07-11 RX ADMIN — LORAZEPAM 1 MG: 2 INJECTION INTRAMUSCULAR; INTRAVENOUS at 21:37

## 2021-07-11 RX ADMIN — NICOTINE 1 PATCH: 21 PATCH, EXTENDED RELEASE TRANSDERMAL at 09:14

## 2021-07-11 RX ADMIN — IPRATROPIUM BROMIDE AND ALBUTEROL SULFATE 3 ML: 2.5; .5 SOLUTION RESPIRATORY (INHALATION) at 10:24

## 2021-07-11 RX ADMIN — ACETAMINOPHEN 650 MG: 325 TABLET, FILM COATED ORAL at 12:01

## 2021-07-11 RX ADMIN — ATORVASTATIN CALCIUM 40 MG: 40 TABLET, FILM COATED ORAL at 21:34

## 2021-07-11 RX ADMIN — ATORVASTATIN CALCIUM 40 MG: 40 TABLET, FILM COATED ORAL at 01:17

## 2021-07-11 RX ADMIN — METOPROLOL TARTRATE 50 MG: 50 TABLET, FILM COATED ORAL at 21:34

## 2021-07-11 RX ADMIN — DOXYCYCLINE 100 MG: 100 TABLET ORAL at 09:14

## 2021-07-11 RX ADMIN — CEFTRIAXONE SODIUM 1 G: 1 INJECTION, POWDER, FOR SOLUTION INTRAMUSCULAR; INTRAVENOUS at 17:14

## 2021-07-11 RX ADMIN — DOXYCYCLINE 100 MG: 100 TABLET ORAL at 01:18

## 2021-07-11 RX ADMIN — LORAZEPAM 0.5 MG: 2 INJECTION INTRAMUSCULAR; INTRAVENOUS at 18:26

## 2021-07-11 RX ADMIN — IPRATROPIUM BROMIDE AND ALBUTEROL SULFATE 3 ML: 2.5; .5 SOLUTION RESPIRATORY (INHALATION) at 20:16

## 2021-07-11 RX ADMIN — DOXYCYCLINE 100 MG: 100 TABLET ORAL at 21:38

## 2021-07-11 RX ADMIN — FAMOTIDINE 20 MG: 20 TABLET, FILM COATED ORAL at 09:14

## 2021-07-11 RX ADMIN — RIVAROXABAN 20 MG: 20 TABLET, FILM COATED ORAL at 17:14

## 2021-07-11 RX ADMIN — FUROSEMIDE 20 MG: 20 TABLET ORAL at 09:14

## 2021-07-11 RX ADMIN — ACETAMINOPHEN 650 MG: 325 TABLET, FILM COATED ORAL at 06:31

## 2021-07-11 NOTE — PROGRESS NOTES
[facility] Medicine Services  INPATIENT PROGRESS NOTE    Patient Name: Isamar Rodriguez  Date of Admission: 7/10/2021  Today's Date: 07/11/21  Length of Stay: 1  Primary Care Physician: Juvenal Wynn MD    Subjective   Chief Complaint: shortness of breath  HPI     Patient seen and examined at bedside.  Patint overall stable and indicates he feels much better.  HR still elevated.  Patient still having some expiatory qheezing, does not appear volume overloaded.          Review of Systems   Constitutional: Negative for activity change, chills, fatigue and fever.   Respiratory: Positive for cough and shortness of breath.    Cardiovascular: Negative for chest pain and palpitations.   Gastrointestinal: Negative for abdominal distention, abdominal pain, constipation, diarrhea, nausea and vomiting.        All pertinent negatives and positives are as above. All other systems have been reviewed and are negative unless otherwise stated.     Objective    Temp:  [97.4 °F (36.3 °C)-98 °F (36.7 °C)] 97.4 °F (36.3 °C)  Heart Rate:  [] 77  Resp:  [16-22] 16  BP: ()/(52-67) 112/55  Physical Exam  Vitals and nursing note reviewed.   Constitutional:       Appearance: He is obese.   HENT:      Head: Normocephalic and atraumatic.      Mouth/Throat:      Mouth: Mucous membranes are moist.      Pharynx: Oropharynx is clear.   Eyes:      General: No scleral icterus.     Conjunctiva/sclera: Conjunctivae normal.   Cardiovascular:      Rate and Rhythm: Tachycardia present. Rhythm irregular.      Heart sounds: Murmur heard.     Pulmonary:      Effort: Pulmonary effort is normal.      Breath sounds: Wheezing present.   Abdominal:      General: Abdomen is flat. Bowel sounds are normal. There is no distension.      Palpations: Abdomen is soft. There is no mass.      Tenderness: There is no abdominal tenderness.      Hernia: No hernia is present.   Skin:     General: Skin is warm and dry.       Coloration: Skin is not pale.   Neurological:      General: No focal deficit present.      Mental Status: He is alert and oriented to person, place, and time.   Psychiatric:         Mood and Affect: Mood normal.         Behavior: Behavior normal.             Results Review:  I have reviewed the labs, radiology results, and diagnostic studies.    Laboratory Data:   Results from last 7 days   Lab Units 07/11/21  0329 07/10/21  2214   WBC 10*3/mm3 12.54* 10.23   HEMOGLOBIN g/dL 11.1* 11.6*   HEMATOCRIT % 35.2* 37.3*   PLATELETS 10*3/mm3 201 227        Results from last 7 days   Lab Units 07/11/21  0328 07/10/21  2214   SODIUM mmol/L 136 136   POTASSIUM mmol/L 5.1 5.1   CHLORIDE mmol/L 100 100   CO2 mmol/L 26.0 25.0   BUN mg/dL 25* 22   CREATININE mg/dL 1.39* 1.27   CALCIUM mg/dL 8.4* 8.8   BILIRUBIN mg/dL 0.7  --    ALK PHOS U/L 55  --    ALT (SGPT) U/L 8  --    AST (SGOT) U/L 11  --    GLUCOSE mg/dL 395* 311*       Culture Data:   No results found for: BLOODCX, URINECX, WOUNDCX, MRSACX, RESPCX, STOOLCX    Radiology Data:   Imaging Results (Last 24 Hours)     Procedure Component Value Units Date/Time    XR Chest 1 View [[id number]] Collected: 07/11/21 1433     Updated: 07/11/21 1437    Narrative:      EXAMINATION: XR CHEST 1 VW-     7/11/2021 2:20 PM CDT     HISTORY: pericardial effusion     1 view chest x-ray.  No comparison.     Enlarged heart with rounded configuration compatible with the provided  history of pericardial effusion.     Interstitial lung disease appears to be chronic though there is  nonvisualization of the left diaphragm behind the heart compatible with  infiltrate or atelectasis within the left lower lobe.     There is no pneumothorax or heart failure.     Summary:  1. Cardiac enlargement.  2. Retrocardiac left lower lobe infiltrate or atelectasis.  This report was finalized on 07/11/2021 14:34 by Dr. Rashaun Liu MD.          I have reviewed the patient's current medications.     Assessment/Plan      Active Hospital Problems    Diagnosis    • Pericardial effusion    • Tobacco dependence    • COPD (chronic obstructive pulmonary disease) (CMS/[facility])    • Atrial fibrillation (CMS/[facility])    • Essential hypertension    • Pulmonary infiltrate    • Obesity (BMI 30-39.9)    • Hypoxemia        Plan:      Overnight patient received digoxin 500 mcg and a 500 cc bolus.  Digoxin level supratherapeutic this AM, will hold AM dose.  AM digoxin level again.  Likely can resume home dose tomorrow.     Cardizem gtt as needed for HR > 110.    Continue solumedrol 40 mg q8h.  Duonebs 4x daily to ipratropium only (due to tachycardia).  Continue ceftriaxone and doxycycline for now.  Low suspicion for pneumonia, continue ceftriaxone and doxycycline for now.  Mucotlytics with mucinex.    Due to history of pericardial effusion, hypotension overnight, and persistent atrial fibrilliation will consult cardiology.  Echo pending.  Continue xarelto for now.    Continue home medications as appropriate.      Counseled on smoking cessation.  Patient reports desire to quit.  Nicotine replacement with nicotine patch.    Patient drinks 4-6 shots per day.  Never has had issues with ETOH withdrawal.  CIWA protocol with as needed medication.    AM labs    Xarelto for VTE PPx    Cardiac diet    Flexeril for neck spasms.        Discharge Planning: I expect the patient to be discharged to home in 2-4 days.    Electronically signed by Cristobal Burch MD, 07/11/21, 18:31 CDT.

## 2021-07-11 NOTE — PLAN OF CARE
Goal Outcome Evaluation:  Plan of Care Reviewed With: patient        Progress: no change  Outcome Summary: VSS this shift. O2 sats WNL on 3L NC. IV rocephin and IV solumedrol given. PO digoxin held due to elevated dig level. Echo results pending. Had to doppler pulses in feet- dusky/edward/cold feet. Patient states he has chronic numbness and tingling in feet. Has multiple wounds on toes. Lasix discontinued, cardiology consulted. CXR shows retrocardiac LLL infiltrate. Creatnine increased to 1.39. Patient screened sepsis positive, no new orders, notified MD. CIWA protocol in place, no signs of withdrawal. Cardizem gtt not started for HR due to low BP.  Safety maintained, no falls. Will cont to monitor and notify MD of any changes. AF  up to 144 on tele.

## 2021-07-11 NOTE — PROGRESS NOTES
Discussed case with Rin.  Patient has Cardizem drip ordered.  HR in 120's to 130's but  which is below their parameters to administer.  Ordered digoxin 500 mcg one time dose.  [facility] and 500 cc NS bolus.        Electronically signed by Elijah De Leon MD, 07/10/21, 20:43 CDT.

## 2021-07-11 NOTE — PLAN OF CARE
Goal Outcome Evaluation:  Plan of Care Reviewed With: patient        Progress: no change  Outcome Summary: VSS. O2 sats WNL on 2L NC. IV abx and IV steroids given. Safety maintained no falls.  on tele.

## 2021-07-11 NOTE — CONSULTS
[facility] HEART GROUP CONSULT NOTE    Referring Provider: Adam Tay MD    Reason for Consultation: Atrial Fibrillation, RVR    Chief complaint: Shortness of Breath        History of present illness:  Mr. Isamar Rodriguez is a 75 y.o. male with history of atrial fibrillation, anticoagulated on Xarelto, HTN, HLD, chronic pain, cigarette smoker, who presented to [facility] ER with complaints of shortness of breath.     Work-up at [facility], he was found to have hypoxia, along with atrial fibrillation with RVR. He was transferred to [facility] for higher level of care.     Repeat labs revealed elevated proBNP of 2483.0. Chemistry and CBC were unremarkable. Noted to be in atrial fibrillation, RVR and was started on cardizem drip.     Cardiology consulted for further evaluation and recommendations.    History  Past Medical History:   Diagnosis Date   • Anxiety and depression    • Arthritis     hands, knees, ankles   • Bilateral foot pain    • Cataract    • Chronic atrial fibrillation (CMS/HCC)    • Concussion 1979   • COPD (chronic obstructive pulmonary disease) (CMS/[facility])    • Hypertension    ,   Past Surgical History:   Procedure Laterality Date   • AMPUTATION DIGIT Bilateral 1/15/2020    Procedure: Fourth toe amputation, second and third hammertoe correction;  Surgeon: Darryl Sellers DPM;  Location: [facility];  Service: Podiatry   • CARDIAC CATHETERIZATION     • CERVICAL FUSION  2006   • COLONOSCOPY     • ENDOSCOPY     • EYE SURGERY Bilateral     cataracts removed with implants   • FOREARM SURGERY Right    • HERNIA REPAIR Bilateral    • TONSILLECTOMY     ,   History reviewed. No pertinent family history.,   Social History     Tobacco Use   • Smoking status: Current Every Day Smoker     Packs/day: 1.50     Years: 50.00     Pack years: 75.00   • Smokeless tobacco: Never Used   Substance Use Topics   • Alcohol use: Yes     Comment: socially   • Drug use: Never   ,  "Patient stated he usually drank 5 mixed drinks a day. \"I like lonnie.\"    Medications  Current Facility-Administered Medications   Medication Dose Route Frequency Provider Last Rate Last Admin   • acetaminophen (TYLENOL) tablet 650 mg  650 mg Oral Q4H PRN Adam Tay MD   650 mg at 07/11/21 1201   • atorvastatin (LIPITOR) tablet 40 mg  40 mg Oral Nightly Adam Tay MD   40 mg at 07/11/21 0117   • cefTRIAXone (ROCEPHIN) 1 g/100 mL 0.9% NS (MBP)  1 g Intravenous Q24H Adam Tay MD   1 g at 07/10/21 1803   • digoxin (LANOXIN) tablet 125 mcg  125 mcg Oral Daily Adam Tay MD       • dilTIAZem (CARDIZEM) 125 mg in 125 mL NS infusion  5-15 mg/hr Intravenous Titrated Adam Tay MD       • doxycycline (ADOXA) tablet 100 mg  100 mg Oral Q12H Adam Tay MD   100 mg at 07/11/21 0914   • enalapril (VASOTEC) tablet 5 mg  5 mg Oral Nightly Adam Tay MD       • famotidine (PEPCID) tablet 20 mg  20 mg Oral BID AC Adam Tay MD   20 mg at 07/11/21 0914   • guaiFENesin (MUCINEX) 12 hr tablet 1,200 mg  1,200 mg Oral Q12H Adam Tay MD   1,200 mg at 07/11/21 0914   • ipratropium-albuterol (DUO-NEB) nebulizer solution 3 mL  3 mL Nebulization 4x Daily - RT Adam Tay MD   3 mL at 07/11/21 1024   • methylPREDNISolone sodium succinate (SOLU-Medrol) injection 40 mg  40 mg Intravenous Q8H Adam Tay MD   40 mg at 07/11/21 0914   • metoprolol tartrate (LOPRESSOR) tablet 50 mg  50 mg Oral Q12H Adam Tay MD   50 mg at 07/11/21 0914   • nicotine (NICODERM CQ) 21 MG/24HR patch 1 patch  1 patch Transdermal Q24H Adam Tay MD   1 patch at 07/11/21 0914   • ondansetron (ZOFRAN) injection 4 mg  4 mg Intravenous Q6H PRN Adam Tay MD       • rivaroxaban (XARELTO) tablet 20 mg  20 mg Oral Daily With Dinner Adam Tay MD   20 mg at 07/10/21 8925   • sodium chloride 0.9 % flush 10 mL  10 mL " "Intravenous Q12H Adam Tay MD   10 mL at 07/11/21 0915   • sodium chloride 0.9 % flush 10 mL  10 mL Intravenous PRN Adam Tay MD       • tamsulosin (FLOMAX) 24 hr capsule 0.4 mg  0.4 mg Oral Daily Adam Tay MD   0.4 mg at 07/11/21 0914   • venlafaxine XR (EFFEXOR-XR) 24 hr capsule 75 mg  75 mg Oral Daily Adam Tay MD   75 mg at 07/11/21 0914       Allergies:  Codeine, Morphine, and Propoxyphene    REVIEW OF SYSTEMS:  Constitutional: Denies fever or chills. Denies change in energy level or malaise. Weakness, \"Just not feeling well.\"  HEENT: Denies headaches or visual disturbances. Denies difficulty swallowing or sore throat.  Respiratory: Shortness of breath, productive cough.   Cardiovascular: Denies chest pain or pressure. Denies palpitations. Denies presyncope/syncope. Denies orthopnea/PND. Denies lower extremity edema.   Gastrointestinal: Denies abdominal pain. Denies nausea/vomiting. Denies change in bowel habits or history of recent GI tract blood loss.   Genitourinary: Denies urinary urgency or frequency. Denies dysuria, hematuria.  Musculoskeletal: Denies pain or swelling in joints.  Neurological: Denies paresthesias. Denies headache. Denies seizure or stroke symptoms.  Behavioral/Psych: Denies problems with anxiety or depression.      All other systems are negative except where stated above.        Objective     Physical Exam:    /53 (BP Location: Left arm, Patient Position: Sitting)   Pulse 103   Temp 97.5 °F (36.4 °C) (Oral)   Resp 18   Ht 185.4 cm (72.99\")   Wt 120 kg (265 lb 9.6 oz)   SpO2 94%   BMI 35.05 kg/m²        General Appearance:    Alert, cooperative, in no acute distress. Chronically ill, unkept.   HEENT:    Normocephalic. Atraumatic. JOEL.   Neck:   No adenopathy, supple, trachea midline, no thyromegaly, no carotid bruit, no JVD   Lungs:     Coarse, diminished throughout with scattered rhonchi and rales, to auscultation, respirations " "regular, even and unlabored    Heart:    Irregular rhythm and normal rate, normal S1 and S2, no murmur, no gallop, no rub, no click   Abdomen:     Normal bowel sounds, obese, round, soft, without masses or organomegaly   Extremities:   Moves all extremities, +BLE edema, BLE with discoloration associated with venous stasis. Small area of eschar surrounded by callus on tip of left great toe. Amputation of 4th toe noted. 2nd on right foot with non-healing wounds. Amputation of 4th toe.    Pulses:   Bilateral feet cool to touch, discolored. Unable to palpate pedal pulses.   Skin:   No bleeding, bruising or rash.   Lymph nodes:   No palpable adenopathy   Neurologic:   Cranial nerves 2 - 12 grossly intact, sensation intact, DTR       present and equal bilaterally             Results Review:   I reviewed the patient's new clinical results.    Lab Results (last 72 hours)     Procedure Component Value Units Date/Time    Digoxin Level [[id number]]  (Abnormal) Collected: 07/11/21 0328    Specimen: Blood Updated: 07/11/21 0423     Digoxin 1.50 ng/mL     Procalcitonin [[id number]]  (Normal) Collected: 07/11/21 0328    Specimen: Blood Updated: 07/11/21 0423     Procalcitonin 0.06 ng/mL     Narrative:      As a Marker for Sepsis (Non-Neonates):     1. <0.5 ng/mL represents a low risk of severe sepsis and/or septic shock.  2. >2 ng/mL represents a high risk of severe sepsis and/or septic shock.    As a Marker for Lower Respiratory Tract Infections that require antibiotic therapy:  PCT on Admission     Antibiotic Therapy             6-12 Hrs later  >0.5                          Strongly Recommended            >0.25 - <0.5             Recommended  0.1 - 0.25                  Discouraged                       Remeasure/reassess PCT  <0.1                         Strongly Discouraged         Remeasure/reassess PCT      As 28 day mortality risk marker: \"Change in Procalcitonin Result\" (>80% or <=80%) if Day 0 (or Day 1) and Day 4 values " are available. Refer to http://[email or web address]-pct-[email or web address]/    Change in PCT <=80 %   A decrease of PCT levels below or equal to 80% defines a positive change in PCT test result representing a higher risk for 28-day all-cause mortality of patients diagnosed with severe sepsis or septic shock.    Change in PCT >80 %   A decrease of PCT levels of more than 80% defines a negative change in PCT result representing a lower risk for 28-day all-cause mortality of patients diagnosed with severe sepsis or septic shock.                Comprehensive Metabolic Panel [[id number]]  (Abnormal) Collected: 07/11/21 0328    Specimen: Blood Updated: 07/11/21 0417     Glucose 395 mg/dL      BUN 25 mg/dL      Creatinine 1.39 mg/dL      Sodium 136 mmol/L      Potassium 5.1 mmol/L      Chloride 100 mmol/L      CO2 26.0 mmol/L      Calcium 8.4 mg/dL      Total Protein 6.4 g/dL      Albumin 3.60 g/dL      ALT (SGPT) 8 U/L      AST (SGOT) 11 U/L      Alkaline Phosphatase 55 U/L      Total Bilirubin 0.7 mg/dL      eGFR Non African Amer 50 mL/min/1.73      Globulin 2.8 gm/dL      A/G Ratio 1.3 g/dL      BUN/Creatinine Ratio 18.0     Anion Gap 10.0 mmol/L     Narrative:      GFR Normal >60  Chronic Kidney Disease <60  Kidney Failure <15      CBC & Differential [[id number]]  (Abnormal) Collected: 07/11/21 0329    Specimen: Blood Updated: 07/11/21 0356    Narrative:      The following orders were created for panel order CBC & Differential.  Procedure                               Abnormality         Status                     ---------                               -----------         ------                     CBC Auto Differential[[id number]]        Abnormal            Final result                 Please view results for these tests on the individual orders.    CBC Auto Differential [[id number]]  (Abnormal) Collected: 07/11/21 0329    Specimen: Blood Updated: 07/11/21 0356     WBC 12.54 10*3/mm3      RBC 3.45 10*6/mm3      Hemoglobin 11.1 g/dL       Hematocrit 35.2 %      .0 fL      MCH 32.2 pg      MCHC 31.5 g/dL      RDW 15.7 %      RDW-SD 57.7 fl      MPV 10.7 fL      Platelets 201 10*3/mm3      Neutrophil % 95.5 %      Lymphocyte % 2.1 %      Monocyte % 1.7 %      Eosinophil % 0.0 %      Basophil % 0.1 %      Immature Grans % 0.6 %      Neutrophils, Absolute 11.99 10*3/mm3      Lymphocytes, Absolute 0.26 10*3/mm3      Monocytes, Absolute 0.21 10*3/mm3      Eosinophils, Absolute 0.00 10*3/mm3      Basophils, Absolute 0.01 10*3/mm3      Immature Grans, Absolute 0.07 10*3/mm3      nRBC 0.0 /100 WBC     Basic Metabolic Panel [[id number]]  (Abnormal) Collected: 07/10/21 2214    Specimen: Blood Updated: 07/10/21 2238     Glucose 311 mg/dL      BUN 22 mg/dL      Creatinine 1.27 mg/dL      Sodium 136 mmol/L      Potassium 5.1 mmol/L      Chloride 100 mmol/L      CO2 25.0 mmol/L      Calcium 8.8 mg/dL      eGFR Non African Amer 55 mL/min/1.73      BUN/Creatinine Ratio 17.3     Anion Gap 11.0 mmol/L     Narrative:      GFR Normal >60  Chronic Kidney Disease <60  Kidney Failure <15      BNP [[id number]]  (Abnormal) Collected: 07/10/21 2214    Specimen: Blood Updated: 07/10/21 2237     proBNP 2,483.0 pg/mL     Narrative:      Among patients with dyspnea, NT-proBNP is highly sensitive for the detection of acute congestive heart failure. In addition NT-proBNP of <300 pg/ml effectively rules out acute congestive heart failure with 99% negative predictive value.    Results may be falsely decreased if patient taking Biotin.      Magnesium [[id number]]  (Normal) Collected: 07/10/21 2214    Specimen: Blood Updated: 07/10/21 2232     Magnesium 2.3 mg/dL     CBC & Differential [[id number]]  (Abnormal) Collected: 07/10/21 2214    Specimen: Blood Updated: 07/10/21 2220    Narrative:      The following orders were created for panel order CBC & Differential.  Procedure                               Abnormality         Status                     ---------                                -----------         ------                     CBC Auto Differential[[id number]]        Abnormal            Final result                 Please view results for these tests on the individual orders.    CBC Auto Differential [[id number]]  (Abnormal) Collected: 07/10/21 2214    Specimen: Blood Updated: 07/10/21 2220     WBC 10.23 10*3/mm3      RBC 3.65 10*6/mm3      Hemoglobin 11.6 g/dL      Hematocrit 37.3 %      .2 fL      MCH 31.8 pg      MCHC 31.1 g/dL      RDW 15.7 %      RDW-SD 57.7 fl      MPV 10.5 fL      Platelets 227 10*3/mm3      Neutrophil % 95.3 %      Lymphocyte % 2.8 %      Monocyte % 1.0 %      Eosinophil % 0.0 %      Basophil % 0.2 %      Immature Grans % 0.7 %      Neutrophils, Absolute 9.75 10*3/mm3      Lymphocytes, Absolute 0.29 10*3/mm3      Monocytes, Absolute 0.10 10*3/mm3      Eosinophils, Absolute 0.00 10*3/mm3      Basophils, Absolute 0.02 10*3/mm3      Immature Grans, Absolute 0.07 10*3/mm3      nRBC 0.0 /100 WBC     Respiratory Panel, PCR (WITHOUT COVID) - Swab, Nasopharynx [[id number]]  (Normal) Collected: 07/10/21 1806    Specimen: Swab from Nasopharynx Updated: 07/10/21 1931     ADENOVIRUS, PCR Not Detected     Coronavirus 229E Not Detected     Coronavirus HKU1 Not Detected     Coronavirus NL63 Not Detected     Coronavirus OC43 Not Detected     Human Metapneumovirus Not Detected     Human Rhinovirus/Enterovirus Not Detected     Influenza B PCR Not Detected     Parainfluenza Virus 1 Not Detected     Parainfluenza Virus 2 Not Detected     Parainfluenza Virus 3 Not Detected     Parainfluenza Virus 4 Not Detected     Bordetella pertussis pcr Not Detected     Chlamydophila pneumoniae PCR Not Detected     Mycoplasma pneumo by PCR Not Detected     Influenza A PCR Not Detected     RSV, PCR Not Detected     Bordetella parapertussis PCR Not Detected    Narrative:      The coronavirus on the RVP is NOT COVID-19 and is NOT indicative of infection with COVID-19.    In the setting of a  positive respiratory panel with a viral infection PLUS a negative procalcitonin without other underlying concern for bacterial infection, consider observing off antibiotics or discontinuation of antibiotics and continue supportive care. If the respiratory panel is positive for atypical bacterial infection (Bordetella pertussis, Chlamydophila pneumoniae, or Mycoplasma pneumoniae), consider antibiotic de-escalation to target atypical bacterial infection.              Assessment/Plan       Atrial Fibrillation with RVR - Currently rate controlled. Anticoagulated on Xarelto.     Pericardial effusion - Noted in 2020    Essential hypertension - Currently Low-normotensive.    Tobacco dependence - Encouraged to stop    COPD (chronic obstructive pulmonary disease) (CMS/HCC), Exacerbation with Hypoxia    PVD with Noted Previous Amputations.    Elevated Glucose - Check HgbA1c    Venous Stasis, BLE      Check 2D Echocardiogram to evaluate systolic function and pericardial effusion.     Further orders per Dr. Hernandez.    Thank you for asking us to follow this patient with you. Please note that this documentation resulted in a face-to-face encounter provided by me.       BOSTON Clark

## 2021-07-12 PROBLEM — E11.65 TYPE 2 DIABETES MELLITUS WITH HYPERGLYCEMIA (HCC): Status: ACTIVE | Noted: 2021-07-12

## 2021-07-12 LAB
ANION GAP SERPL CALCULATED.3IONS-SCNC: 10 MMOL/L (ref 5–15)
BH CV ECHO MEAS - AO MAX PG (FULL): 3.9 MMHG
BH CV ECHO MEAS - AO MAX PG: 6.4 MMHG
BH CV ECHO MEAS - AO MEAN PG (FULL): 1.5 MMHG
BH CV ECHO MEAS - AO MEAN PG: 2.5 MMHG
BH CV ECHO MEAS - AO ROOT AREA (BSA CORRECTED): 1.3
BH CV ECHO MEAS - AO ROOT AREA: 7.5 CM^2
BH CV ECHO MEAS - AO ROOT DIAM: 3.1 CM
BH CV ECHO MEAS - AO V2 MAX: 126 CM/SEC
BH CV ECHO MEAS - AO V2 MEAN: 77.1 CM/SEC
BH CV ECHO MEAS - AO V2 VTI: 20.3 CM
BH CV ECHO MEAS - AVA(I,A): 2.5 CM^2
BH CV ECHO MEAS - AVA(I,D): 2.5 CM^2
BH CV ECHO MEAS - AVA(V,A): 2.8 CM^2
BH CV ECHO MEAS - AVA(V,D): 2.8 CM^2
BH CV ECHO MEAS - BSA(HAYCOCK): 2.5 M^2
BH CV ECHO MEAS - BSA: 2.4 M^2
BH CV ECHO MEAS - BZI_BMI: 35 KILOGRAMS/M^2
BH CV ECHO MEAS - BZI_METRIC_HEIGHT: 185.4 CM
BH CV ECHO MEAS - BZI_METRIC_WEIGHT: 120.2 KG
BH CV ECHO MEAS - EDV(CUBED): 161.9 ML
BH CV ECHO MEAS - EDV(MOD-SP2): 61.3 ML
BH CV ECHO MEAS - EDV(MOD-SP4): 80.9 ML
BH CV ECHO MEAS - EDV(TEICH): 144.4 ML
BH CV ECHO MEAS - EF(CUBED): 69 %
BH CV ECHO MEAS - EF(MOD-SP2): 66.6 %
BH CV ECHO MEAS - EF(MOD-SP4): 69.3 %
BH CV ECHO MEAS - EF(TEICH): 60 %
BH CV ECHO MEAS - ESV(CUBED): 50.2 ML
BH CV ECHO MEAS - ESV(MOD-SP2): 20.5 ML
BH CV ECHO MEAS - ESV(MOD-SP4): 24.8 ML
BH CV ECHO MEAS - ESV(TEICH): 57.8 ML
BH CV ECHO MEAS - FS: 32.3 %
BH CV ECHO MEAS - IVS/LVPW: 0.85
BH CV ECHO MEAS - IVSD: 0.83 CM
BH CV ECHO MEAS - LA DIMENSION: 5.4 CM
BH CV ECHO MEAS - LA/AO: 1.7
BH CV ECHO MEAS - LAT PEAK E' VEL: 8.5 CM/SEC
BH CV ECHO MEAS - LV DIASTOLIC VOL/BSA (35-75): 33.4 ML/M^2
BH CV ECHO MEAS - LV MASS(C)D: 182.8 GRAMS
BH CV ECHO MEAS - LV MASS(C)DI: 75.4 GRAMS/M^2
BH CV ECHO MEAS - LV MAX PG: 2.5 MMHG
BH CV ECHO MEAS - LV MEAN PG: 1 MMHG
BH CV ECHO MEAS - LV SYSTOLIC VOL/BSA (12-30): 10.2 ML/M^2
BH CV ECHO MEAS - LV V1 MAX: 78.3 CM/SEC
BH CV ECHO MEAS - LV V1 MEAN: 47.4 CM/SEC
BH CV ECHO MEAS - LV V1 VTI: 11.1 CM
BH CV ECHO MEAS - LVIDD: 5.5 CM
BH CV ECHO MEAS - LVIDS: 3.7 CM
BH CV ECHO MEAS - LVLD AP2: 7.1 CM
BH CV ECHO MEAS - LVLD AP4: 6.5 CM
BH CV ECHO MEAS - LVLS AP2: 6.3 CM
BH CV ECHO MEAS - LVLS AP4: 5.1 CM
BH CV ECHO MEAS - LVOT AREA (M): 4.5 CM^2
BH CV ECHO MEAS - LVOT AREA: 4.5 CM^2
BH CV ECHO MEAS - LVOT DIAM: 2.4 CM
BH CV ECHO MEAS - LVPWD: 0.97 CM
BH CV ECHO MEAS - MED PEAK E' VEL: 9.36 CM/SEC
BH CV ECHO MEAS - MR MAX PG: 44.9 MMHG
BH CV ECHO MEAS - MR MAX VEL: 316 CM/SEC
BH CV ECHO MEAS - MV DEC TIME: 0.19 SEC
BH CV ECHO MEAS - MV E MAX VEL: 124 CM/SEC
BH CV ECHO MEAS - SI(AO): 63.2 ML/M^2
BH CV ECHO MEAS - SI(CUBED): 46 ML/M^2
BH CV ECHO MEAS - SI(LVOT): 20.7 ML/M^2
BH CV ECHO MEAS - SI(MOD-SP2): 16.8 ML/M^2
BH CV ECHO MEAS - SI(MOD-SP4): 23.1 ML/M^2
BH CV ECHO MEAS - SI(TEICH): 35.7 ML/M^2
BH CV ECHO MEAS - SV(AO): 153.2 ML
BH CV ECHO MEAS - SV(CUBED): 111.6 ML
BH CV ECHO MEAS - SV(LVOT): 50.2 ML
BH CV ECHO MEAS - SV(MOD-SP2): 40.8 ML
BH CV ECHO MEAS - SV(MOD-SP4): 56.1 ML
BH CV ECHO MEAS - SV(TEICH): 86.6 ML
BH CV ECHO MEAS - TR MAX VEL: 208 CM/SEC
BH CV ECHO MEASUREMENTS AVERAGE E/E' RATIO: 13.89
BUN SERPL-MCNC: 26 MG/DL (ref 8–23)
BUN/CREAT SERPL: 21.1 (ref 7–25)
CALCIUM SPEC-SCNC: 8.4 MG/DL (ref 8.6–10.5)
CHLORIDE SERPL-SCNC: 99 MMOL/L (ref 98–107)
CO2 SERPL-SCNC: 27 MMOL/L (ref 22–29)
CREAT SERPL-MCNC: 1.23 MG/DL (ref 0.76–1.27)
DEPRECATED RDW RBC AUTO: 57.1 FL (ref 37–54)
DIGOXIN SERPL-MCNC: 0.7 NG/ML (ref 0.6–1.2)
ERYTHROCYTE [DISTWIDTH] IN BLOOD BY AUTOMATED COUNT: 15.5 % (ref 12.3–15.4)
GFR SERPL CREATININE-BSD FRML MDRD: 57 ML/MIN/1.73
GLUCOSE BLDC GLUCOMTR-MCNC: 272 MG/DL (ref 70–130)
GLUCOSE BLDC GLUCOMTR-MCNC: 285 MG/DL (ref 70–130)
GLUCOSE SERPL-MCNC: 224 MG/DL (ref 65–99)
HCT VFR BLD AUTO: 32.5 % (ref 37.5–51)
HGB BLD-MCNC: 10.2 G/DL (ref 13–17.7)
LEFT ATRIUM VOLUME INDEX: 51.9 ML/M2
LEFT ATRIUM VOLUME: 126 CM3
MAGNESIUM SERPL-MCNC: 2.2 MG/DL (ref 1.6–2.4)
MAXIMAL PREDICTED HEART RATE: 145 BPM
MCH RBC QN AUTO: 32 PG (ref 26.6–33)
MCHC RBC AUTO-ENTMCNC: 31.4 G/DL (ref 31.5–35.7)
MCV RBC AUTO: 101.9 FL (ref 79–97)
PLATELET # BLD AUTO: 187 10*3/MM3 (ref 140–450)
PMV BLD AUTO: 11.1 FL (ref 6–12)
POTASSIUM SERPL-SCNC: 4.8 MMOL/L (ref 3.5–5.2)
PROCALCITONIN SERPL-MCNC: 0.05 NG/ML (ref 0–0.25)
QT INTERVAL: 356 MS
QTC INTERVAL: 470 MS
RBC # BLD AUTO: 3.19 10*6/MM3 (ref 4.14–5.8)
SODIUM SERPL-SCNC: 136 MMOL/L (ref 136–145)
STRESS TARGET HR: 123 BPM
WBC # BLD AUTO: 15.45 10*3/MM3 (ref 3.4–10.8)

## 2021-07-12 PROCEDURE — 82962 GLUCOSE BLOOD TEST: CPT

## 2021-07-12 PROCEDURE — 83735 ASSAY OF MAGNESIUM: CPT | Performed by: INTERNAL MEDICINE

## 2021-07-12 PROCEDURE — 25010000002 METHYLPREDNISOLONE PER 40 MG: Performed by: NURSE PRACTITIONER

## 2021-07-12 PROCEDURE — 84145 PROCALCITONIN (PCT): CPT | Performed by: INTERNAL MEDICINE

## 2021-07-12 PROCEDURE — 80048 BASIC METABOLIC PNL TOTAL CA: CPT | Performed by: INTERNAL MEDICINE

## 2021-07-12 PROCEDURE — 85027 COMPLETE CBC AUTOMATED: CPT | Performed by: INTERNAL MEDICINE

## 2021-07-12 PROCEDURE — 25010000002 CEFTRIAXONE PER 250 MG: Performed by: INTERNAL MEDICINE

## 2021-07-12 PROCEDURE — 99221 1ST HOSP IP/OBS SF/LOW 40: CPT | Performed by: NURSE PRACTITIONER

## 2021-07-12 PROCEDURE — 25010000002 METHYLPREDNISOLONE PER 40 MG: Performed by: INTERNAL MEDICINE

## 2021-07-12 PROCEDURE — 94799 UNLISTED PULMONARY SVC/PX: CPT

## 2021-07-12 PROCEDURE — 99232 SBSQ HOSP IP/OBS MODERATE 35: CPT | Performed by: NURSE PRACTITIONER

## 2021-07-12 PROCEDURE — 63710000001 INSULIN LISPRO (HUMAN) PER 5 UNITS: Performed by: NURSE PRACTITIONER

## 2021-07-12 PROCEDURE — 80162 ASSAY OF DIGOXIN TOTAL: CPT | Performed by: INTERNAL MEDICINE

## 2021-07-12 RX ORDER — METHYLPREDNISOLONE SODIUM SUCCINATE 40 MG/ML
40 INJECTION, POWDER, LYOPHILIZED, FOR SOLUTION INTRAMUSCULAR; INTRAVENOUS EVERY 12 HOURS
Status: DISCONTINUED | OUTPATIENT
Start: 2021-07-12 | End: 2021-07-13 | Stop reason: HOSPADM

## 2021-07-12 RX ORDER — DEXTROSE MONOHYDRATE 25 G/50ML
25 INJECTION, SOLUTION INTRAVENOUS
Status: DISCONTINUED | OUTPATIENT
Start: 2021-07-12 | End: 2021-07-13 | Stop reason: HOSPADM

## 2021-07-12 RX ORDER — NICOTINE POLACRILEX 4 MG
15 LOZENGE BUCCAL
Status: DISCONTINUED | OUTPATIENT
Start: 2021-07-12 | End: 2021-07-13 | Stop reason: HOSPADM

## 2021-07-12 RX ADMIN — METHYLPREDNISOLONE SODIUM SUCCINATE 40 MG: 40 INJECTION, POWDER, LYOPHILIZED, FOR SOLUTION INTRAMUSCULAR; INTRAVENOUS at 09:02

## 2021-07-12 RX ADMIN — IPRATROPIUM BROMIDE 0.5 MG: 0.5 SOLUTION RESPIRATORY (INHALATION) at 06:51

## 2021-07-12 RX ADMIN — GUAIFENESIN 1200 MG: 600 TABLET, EXTENDED RELEASE ORAL at 08:47

## 2021-07-12 RX ADMIN — METOPROLOL TARTRATE 50 MG: 50 TABLET, FILM COATED ORAL at 08:47

## 2021-07-12 RX ADMIN — ENALAPRIL MALEATE 5 MG: 5 TABLET ORAL at 20:45

## 2021-07-12 RX ADMIN — SODIUM CHLORIDE, PRESERVATIVE FREE 10 ML: 5 INJECTION INTRAVENOUS at 20:36

## 2021-07-12 RX ADMIN — VENLAFAXINE HYDROCHLORIDE 75 MG: 75 CAPSULE, EXTENDED RELEASE ORAL at 08:47

## 2021-07-12 RX ADMIN — ACETAMINOPHEN 650 MG: 325 TABLET, FILM COATED ORAL at 14:55

## 2021-07-12 RX ADMIN — IPRATROPIUM BROMIDE 0.5 MG: 0.5 SOLUTION RESPIRATORY (INHALATION) at 10:36

## 2021-07-12 RX ADMIN — METOPROLOL TARTRATE 50 MG: 50 TABLET, FILM COATED ORAL at 20:45

## 2021-07-12 RX ADMIN — CYCLOBENZAPRINE 5 MG: 10 TABLET, FILM COATED ORAL at 20:36

## 2021-07-12 RX ADMIN — DOXYCYCLINE 100 MG: 100 TABLET ORAL at 08:47

## 2021-07-12 RX ADMIN — METHYLPREDNISOLONE SODIUM SUCCINATE 40 MG: 40 INJECTION, POWDER, FOR SOLUTION INTRAMUSCULAR; INTRAVENOUS at 20:37

## 2021-07-12 RX ADMIN — RIVAROXABAN 20 MG: 20 TABLET, FILM COATED ORAL at 17:52

## 2021-07-12 RX ADMIN — DOXYCYCLINE 100 MG: 100 TABLET ORAL at 20:36

## 2021-07-12 RX ADMIN — INSULIN LISPRO 6 UNITS: 100 INJECTION, SOLUTION INTRAVENOUS; SUBCUTANEOUS at 17:52

## 2021-07-12 RX ADMIN — METHYLPREDNISOLONE SODIUM SUCCINATE 40 MG: 40 INJECTION, POWDER, LYOPHILIZED, FOR SOLUTION INTRAMUSCULAR; INTRAVENOUS at 02:40

## 2021-07-12 RX ADMIN — NICOTINE 1 PATCH: 21 PATCH, EXTENDED RELEASE TRANSDERMAL at 08:47

## 2021-07-12 RX ADMIN — SODIUM CHLORIDE, PRESERVATIVE FREE 10 ML: 5 INJECTION INTRAVENOUS at 08:52

## 2021-07-12 RX ADMIN — GUAIFENESIN 1200 MG: 600 TABLET, EXTENDED RELEASE ORAL at 20:36

## 2021-07-12 RX ADMIN — FAMOTIDINE 20 MG: 20 TABLET, FILM COATED ORAL at 17:52

## 2021-07-12 RX ADMIN — ATORVASTATIN CALCIUM 40 MG: 40 TABLET, FILM COATED ORAL at 20:36

## 2021-07-12 RX ADMIN — IPRATROPIUM BROMIDE 0.5 MG: 0.5 SOLUTION RESPIRATORY (INHALATION) at 15:44

## 2021-07-12 RX ADMIN — IPRATROPIUM BROMIDE 0.5 MG: 0.5 SOLUTION RESPIRATORY (INHALATION) at 20:41

## 2021-07-12 RX ADMIN — DIGOXIN 125 MCG: 125 TABLET ORAL at 12:23

## 2021-07-12 RX ADMIN — TAMSULOSIN HYDROCHLORIDE 0.4 MG: 0.4 CAPSULE ORAL at 08:47

## 2021-07-12 RX ADMIN — FAMOTIDINE 20 MG: 20 TABLET, FILM COATED ORAL at 08:47

## 2021-07-12 RX ADMIN — CEFTRIAXONE SODIUM 1 G: 1 INJECTION, POWDER, FOR SOLUTION INTRAMUSCULAR; INTRAVENOUS at 17:52

## 2021-07-12 NOTE — PROGRESS NOTES
"    [facility] Medicine Services  INPATIENT PROGRESS NOTE    Patient Name: Isamar Rodriguez  Date of Admission: 7/10/2021  Today's Date: 07/12/21  Length of Stay: 2  Primary Care Physician: Juvenal Wynn MD    Subjective   Chief Complaint: shortness of breath  HPI   He was sitting on the edge of the bed resting comfortably.  States breathing feels better today as compared to yesterday.  He has a nonproductive cough.  Afebrile.  He has some dyspnea with exertion, relieved with rest and oxygen.  Requiring 3 L nasal cannula with last SPO2 documented 95%.  Encourage use of incentive spirometry.  He is tolerating a diet.  Denies nausea, vomiting or abdominal pain.    Reports in the past he was on medicine for type 2 diabetes and had a glucometer at one time.  However, states that was \"years ago.\"    Review of Systems   All pertinent negatives and positives are as above. All other systems have been reviewed and are negative unless otherwise stated.     Objective    Temp:  [97.4 °F (36.3 °C)-98.2 °F (36.8 °C)] 97.4 °F (36.3 °C)  Heart Rate:  [75-95] 86  Resp:  [16] 16  BP: (106-121)/(52-96) 121/96  Physical Exam  Vitals reviewed.   Constitutional:       General: He is not in acute distress.     Appearance: He is not toxic-appearing.      Interventions: Nasal cannula in place.      Comments: Sitting on the edge of the bed.  No acute distress.  Tolerating room air.  On 3 L nasal cannula.   HENT:      Head: Normocephalic and atraumatic.      Mouth/Throat:      Mouth: Mucous membranes are moist.      Pharynx: Oropharynx is clear.   Eyes:      Extraocular Movements: Extraocular movements intact.      Conjunctiva/sclera: Conjunctivae normal.      Pupils: Pupils are equal, round, and reactive to light.   Cardiovascular:      Rate and Rhythm: Normal rate and regular rhythm.      Pulses: Normal pulses.      Comments: Atrial fibrillation 78-99  Pulmonary:      Effort: Pulmonary effort is normal. " No respiratory distress.      Breath sounds: Normal breath sounds. No wheezing.   Abdominal:      General: Bowel sounds are normal. There is no distension.      Palpations: Abdomen is soft.      Tenderness: There is no abdominal tenderness.   Musculoskeletal:         General: No swelling or tenderness. Normal range of motion.      Cervical back: Normal range of motion and neck supple. No muscular tenderness.   Skin:     General: Skin is warm and dry.      Findings: No erythema or rash.   Neurological:      General: No focal deficit present.      Mental Status: He is alert and oriented to person, place, and time.      Cranial Nerves: No cranial nerve deficit.      Motor: No weakness.   Psychiatric:         Mood and Affect: Mood normal.         Behavior: Behavior normal.       Results Review:  I have reviewed the labs, radiology results, and diagnostic studies.    Laboratory Data:   Results from last 7 days   Lab Units 07/12/21  0426 07/11/21  0329 07/10/21  2214   WBC 10*3/mm3 15.45* 12.54* 10.23   HEMOGLOBIN g/dL 10.2* 11.1* 11.6*   HEMATOCRIT % 32.5* 35.2* 37.3*   PLATELETS 10*3/mm3 187 201 227        Results from last 7 days   Lab Units 07/12/21  0426 07/11/21  0328 07/10/21  2214   SODIUM mmol/L 136 136 136   POTASSIUM mmol/L 4.8 5.1 5.1   CHLORIDE mmol/L 99 100 100   CO2 mmol/L 27.0 26.0 25.0   BUN mg/dL 26* 25* 22   CREATININE mg/dL 1.23 1.39* 1.27   CALCIUM mg/dL 8.4* 8.4* 8.8   BILIRUBIN mg/dL  --  0.7  --    ALK PHOS U/L  --  55  --    ALT (SGPT) U/L  --  8  --    AST (SGOT) U/L  --  11  --    GLUCOSE mg/dL 224* 395* 311*     I have reviewed the patient's current medications.     Assessment/Plan     Active Hospital Problems    Diagnosis    • **COPD (chronic obstructive pulmonary disease) (CMS/HCC)    • Type 2 diabetes mellitus with hyperglycemia (CMS/HCC)    • Pericardial effusion    • Tobacco dependence    • Atrial fibrillation (CMS/HCC) with rapid ventricular response    • Essential hypertension    •  Pulmonary infiltrate    • Obesity (BMI 30-39.9)    • Hypoxemia      Plan:  1.  Patient transferred from [facility] on 7/10 for hypoxemia, pericardial effusion, and atrial fibrillation with rapid ventricular response.  He initially presented for weakness and shortness of breath.  Upon evaluation at [facility] emergency department, he was noted to be hypoxic in the 80s. Of note, he was recently diagnosed with pericardial effusion and an upcoming appointment with a specialist in [place].  He was given IV digoxin injection 500 mcg for atrial fibrillation with rapid ventricular response in addition to 500 cc bolus.  Digoxin level was supratherapeutic and home dose was held on 7/11.    2.  Cardiology evaluating patient.  Continue digoxin 125 mcg and Lopressor 50 mg twice daily.  3.  Transthoracic echocardiogram results reviewed  Results for orders placed during the hospital encounter of 07/10/21    Adult Transthoracic Echo Complete W/ Cont if Necessary Per Protocol    Interpretation Summary  · Left ventricular systolic function is normal. Left ventricular ejection fraction appears to be 61 - 65%.  · Normal right ventricular cavity size and systolic function noted.  · Cardiac valves are poorly visualized, however no obvious abnormalities are noted by Doppler assessment.  · There is a moderate (1-2cm) pericardial effusion without echocardiographic evidence for tamponade.  4.  Continue Solu-Medrol 40 mg every 12 hours.  Atrovent nebulizer treatments 4 times daily.  Continue Mucinex.  Respiratory PCR panel negative.  5.  Hemoglobin A1c 7.8.  Previous hemoglobin A1c in September 2018 was 6.0.  Diabetes educator.  Initiate moderate sliding scale insulin.  Will discuss with Dr. Burch, will likely start on Metformin as outpatient.  6.  DVT prophylaxis achieved by virtue being chronically anticoagulated on Xarelto for history of atrial fibrillation.  7.  Strongly encouraged tobacco cessation.  Offer nicotine  patch.  8.  [facility] protocol with as needed medication.  States he would previously drink bourbon but has not had a drink in over a month.    Discharge Planning: I expect the patient to be discharged to home likely tomorrow.    Electronically signed by BOSTON Jimenes, 07/12/21, 15:38 CDT.

## 2021-07-12 NOTE — CONSULTS
[facility]  INPATIENT WOUND CONSULTATION    Today's Date: 07/12/21    Patient Name: Isamar Rodriguez  MRN: [id number]  [facility]: [id number]  PCP: Juvenal Wynn MD  Referring Provider: Cristobal Burch MD  Attending Provider: Cristobal Burch MD  Length of Stay: 2    SUBJECTIVE   Chief Complaint: Wounds of toes of bilateral feet    HPI: Isamar Rodriguez, a 75 y.o.male, presents with a past medical history hypertension, chronic obstructive pulmonary disease, tobacco dependence, and arthritis.  Patient was transferred from [facility] due to hypoxemia, pericardial effusion, and A. fib with RVR.  Patient smokes 2 packs of cigarettes a day.  Inpatient wound care is consulted due to wounds of toes of bilateral feet.  On assessment patient is found to have vascular insufficiency with wounds of the right second and third toes and left distal great toe.  Patient states he was cutting his own toenails and noticed a cut the end of his great toe on his left foot.  Hemosiderin staining is found on bilateral distal legs patient states that lately he has had more swelling and when he gets a wound on his leg it seems to take a long time to heal.      Visit Dx:  No diagnosis found.  Patient Active Problem List   Diagnosis   • Bilateral low back pain without sciatica   • Chronic left shoulder pain   • Chronic pain disorder   • Elevated prostate specific antigen (PSA)   • Essential hypertension   • IGT (impaired glucose tolerance)   • Long term current use of anticoagulant therapy   • Mixed hyperlipidemia   • Neuropathy   • Prostate cancer screening   • Smoking addiction   • Hammer toes of both feet   • Pain in toes of both feet   • Pericardial effusion   • Tobacco dependence   • COPD (chronic obstructive pulmonary disease) (CMS/HCC)   • Atrial fibrillation (CMS/HCC) with rapid ventricular response   • Essential hypertension   • Pulmonary infiltrate   • Obesity (BMI 30-39.9)   • Hypoxemia        History:   Past Medical History:   Diagnosis Date   • Anxiety and depression    • Arthritis     hands, knees, ankles   • Bilateral foot pain    • Cataract    • Chronic atrial fibrillation (CMS/HCC)    • Concussion 1979   • COPD (chronic obstructive pulmonary disease) (CMS/HCC)    • Hypertension      Past Surgical History:   Procedure Laterality Date   • AMPUTATION DIGIT Bilateral 1/15/2020    Procedure: Fourth toe amputation, second and third hammertoe correction;  Surgeon: Darryl Sellers DPM;  Location: [facility];  Service: Podiatry   • CARDIAC CATHETERIZATION     • CERVICAL FUSION  2006   • COLONOSCOPY     • ENDOSCOPY     • EYE SURGERY Bilateral     cataracts removed with implants   • FOREARM SURGERY Right    • HERNIA REPAIR Bilateral    • TONSILLECTOMY       Social History     Socioeconomic History   • Marital status:      Spouse name: Not on file   • Number of children: Not on file   • Years of education: Not on file   • Highest education level: Not on file   Tobacco Use   • Smoking status: Current Every Day Smoker     Packs/day: 1.50     Years: 50.00     Pack years: 75.00   • Smokeless tobacco: Never Used   Substance and Sexual Activity   • Alcohol use: Yes     Comment: socially   • Drug use: Never   • Sexual activity: Defer     History reviewed. No pertinent family history.    Allergies:  Allergies   Allergen Reactions   • Codeine Itching     Itching  Pt denies allergy to codeine. 2/17/16 JIMY Engle   • Morphine Itching     itching   • Propoxyphene Itching       Medications:    Current Facility-Administered Medications:   •  acetaminophen (TYLENOL) tablet 650 mg, 650 mg, Oral, Q4H PRN, Adam Tay MD, 650 mg at 07/12/21 1455  •  atorvastatin (LIPITOR) tablet 40 mg, 40 mg, Oral, Nightly, Adam Tay MD, 40 mg at 07/11/21 2134  •  cefTRIAXone (ROCEPHIN) 1 g/100 mL 0.9% NS (MBP), 1 g, Intravenous, Q24H, Adam Tay MD, 1 g at 07/11/21 1714  •  cyclobenzaprine  (FLEXERIL) tablet 5 mg, 5 mg, Oral, TID PRN, Cristobal Burch MD, 5 mg at 07/11/21 2134  •  digoxin (LANOXIN) tablet 125 mcg, 125 mcg, Oral, Daily, Adam Tay MD, 125 mcg at 07/12/21 1223  •  dilTIAZem (CARDIZEM) 125 mg in 125 mL NS infusion, 5-15 mg/hr, Intravenous, Titrated, Adam Tay MD  •  doxycycline (ADOXA) tablet 100 mg, 100 mg, Oral, Q12H, Adam Tay MD, 100 mg at 07/12/21 0847  •  enalapril (VASOTEC) tablet 5 mg, 5 mg, Oral, Nightly, Adam Tay MD  •  famotidine (PEPCID) tablet 20 mg, 20 mg, Oral, BID AC, Adam Tay MD, 20 mg at 07/12/21 0847  •  guaiFENesin (MUCINEX) 12 hr tablet 1,200 mg, 1,200 mg, Oral, Q12H, Adam Tay MD, 1,200 mg at 07/12/21 0847  •  ipratropium (ATROVENT) nebulizer solution 0.5 mg, 0.5 mg, Nebulization, 4x Daily - RT, Cristobal Burch MD, 0.5 mg at 07/12/21 1036  •  LORazepam (ATIVAN) tablet 0.5 mg, 0.5 mg, Oral, Q2H PRN **OR** LORazepam (ATIVAN) injection 0.5 mg, 0.5 mg, Intravenous, Q2H PRN, 0.5 mg at 07/11/21 1826 **OR** LORazepam (ATIVAN) tablet 1 mg, 1 mg, Oral, Q1H PRN **OR** LORazepam (ATIVAN) injection 1 mg, 1 mg, Intravenous, Q1H PRN, 1 mg at 07/11/21 2137 **OR** LORazepam (ATIVAN) injection 1 mg, 1 mg, Intravenous, Q15 Min PRN **OR** LORazepam (ATIVAN) injection 1 mg, 1 mg, Intramuscular, Q15 Min PRN, Cristobal Burch MD  •  methylPREDNISolone sodium succinate (SOLU-Medrol) injection 40 mg, 40 mg, Intravenous, Q12H, Anabela Randall APRN  •  metoprolol tartrate (LOPRESSOR) tablet 50 mg, 50 mg, Oral, Q12H, Adam Tay MD, 50 mg at 07/12/21 0847  •  nicotine (NICODERM CQ) 21 MG/24HR patch 1 patch, 1 patch, Transdermal, Q24H, Adam Tay MD, 1 patch at 07/12/21 0847  •  ondansetron (ZOFRAN) injection 4 mg, 4 mg, Intravenous, Q6H PRN, Adam Tay MD  •  rivaroxaban (XARELTO) tablet 20 mg, 20 mg, Oral, Daily With Dinner, Adam Tay MD, 20 mg at 07/11/21 0374  •   sodium chloride 0.9 % flush 10 mL, 10 mL, Intravenous, Q12H, Adam Tay MD, 10 mL at 07/12/21 0852  •  sodium chloride 0.9 % flush 10 mL, 10 mL, Intravenous, PRN, Adam Tay MD  •  tamsulosin (FLOMAX) 24 hr capsule 0.4 mg, 0.4 mg, Oral, Daily, Adam Tay MD, 0.4 mg at 07/12/21 0847  •  venlafaxine XR (EFFEXOR-XR) 24 hr capsule 75 mg, 75 mg, Oral, Daily, Adam Tay MD, 75 mg at 07/12/21 0847    Review of Systems:  Review of Systems   Constitutional: Positive for fatigue. Negative for chills and fever.   HENT: Negative for rhinorrhea and sore throat.    Respiratory: Positive for cough. Negative for shortness of breath.    Cardiovascular: Positive for leg swelling. Negative for chest pain and palpitations.   Gastrointestinal: Negative for diarrhea, nausea and vomiting.   Genitourinary: Negative for frequency and urgency.   Musculoskeletal: Positive for myalgias. Negative for arthralgias.   Skin: Positive for color change and wound.   Neurological: Positive for weakness. Negative for dizziness.   Psychiatric/Behavioral: Negative for agitation and confusion.         OBJECTIVE     Vitals:    07/12/21 1213   BP: 121/96   Pulse: 86   Resp: 16   Temp: 97.4 °F (36.3 °C)   SpO2: 95%       PHYSICAL EXAM  Physical Exam  Vitals and nursing note reviewed.   Constitutional:       Appearance: He is obese.      Comments: BMI 36.16   HENT:      Head: Normocephalic and atraumatic.   Eyes:      General: Lids are normal. Gaze aligned appropriately.   Cardiovascular:      Rate and Rhythm: Normal rate and regular rhythm.      Pulses:           Dorsalis pedis pulses are 2+ on the right side and 2+ on the left side.        Posterior tibial pulses are 2+ on the right side and 2+ on the left side.   Pulmonary:      Effort: Pulmonary effort is normal. No respiratory distress.   Abdominal:      General: There is no distension.      Palpations: Abdomen is soft.   Musculoskeletal:      Cervical back:  Normal range of motion and neck supple.   Feet:      Right foot:      Skin integrity: Ulcer and erythema present.      Left foot:      Skin integrity: Ulcer and erythema present.      Comments: Patient has 2+ pedal pulses with hemosiderin staining and arterial ulcers of toes of bilateral feet present.  Ulcerations on right foot appear black, dry with no skin loss.  Periwound skin is pink and blanchable.  Ulcers are on 2nd dorsal and medial toe and 3rd lateral toe.  On the left foot, the great toe has an arterial ulceration that was caused by cutting of his toenail and the patient accidentally cut the end of his toe.  There is minimal skin loss with a dry wound base and pink blanchable periwound area.  This is a chronic wound.    Skin:     General: Skin is warm and dry.      Findings: Erythema and wound present.   Neurological:      Mental Status: He is alert and oriented to person, place, and time.   Psychiatric:         Attention and Perception: Attention normal.         Mood and Affect: Mood normal.         Speech: Speech normal.         Behavior: Behavior is cooperative.                              Results Review:  Lab Results (last 48 hours)     Procedure Component Value Units Date/Time    Procalcitonin [[id number]]  (Normal) Collected: 07/12/21 0426    Specimen: Blood Updated: 07/12/21 0549     Procalcitonin 0.05 ng/mL     Narrative:      As a Marker for Sepsis (Non-Neonates):     1. <0.5 ng/mL represents a low risk of severe sepsis and/or septic shock.  2. >2 ng/mL represents a high risk of severe sepsis and/or septic shock.    As a Marker for Lower Respiratory Tract Infections that require antibiotic therapy:  PCT on Admission     Antibiotic Therapy             6-12 Hrs later  >0.5                          Strongly Recommended            >0.25 - <0.5             Recommended  0.1 - 0.25                  Discouraged                       Remeasure/reassess PCT  <0.1                         Strongly Discouraged   "       Remeasure/reassess PCT      As 28 day mortality risk marker: \"Change in Procalcitonin Result\" (>80% or <=80%) if Day 0 (or Day 1) and Day 4 values are available. Refer to http://www.[facility]-pct-calculator.com/    Change in PCT <=80 %   A decrease of PCT levels below or equal to 80% defines a positive change in PCT test result representing a higher risk for 28-day all-cause mortality of patients diagnosed with severe sepsis or septic shock.    Change in PCT >80 %   A decrease of PCT levels of more than 80% defines a negative change in PCT result representing a lower risk for 28-day all-cause mortality of patients diagnosed with severe sepsis or septic shock.                Basic Metabolic Panel [[id number]]  (Abnormal) Collected: 07/12/21 0426    Specimen: Blood Updated: 07/12/21 0548     Glucose 224 mg/dL      BUN 26 mg/dL      Creatinine 1.23 mg/dL      Sodium 136 mmol/L      Potassium 4.8 mmol/L      Chloride 99 mmol/L      CO2 27.0 mmol/L      Calcium 8.4 mg/dL      eGFR Non African Amer 57 mL/min/1.73      BUN/Creatinine Ratio 21.1     Anion Gap 10.0 mmol/L     Narrative:      GFR Normal >60  Chronic Kidney Disease <60  Kidney Failure <15      Magnesium [[id number]]  (Normal) Collected: 07/12/21 0426    Specimen: Blood Updated: 07/12/21 0542     Magnesium 2.2 mg/dL     Digoxin Level [[id number]]  (Normal) Collected: 07/12/21 0426    Specimen: Blood Updated: 07/12/21 0542     Digoxin 0.70 ng/mL     CBC (No Diff) [[id number]]  (Abnormal) Collected: 07/12/21 0426    Specimen: Blood Updated: 07/12/21 0527     WBC 15.45 10*3/mm3      RBC 3.19 10*6/mm3      Hemoglobin 10.2 g/dL      Hematocrit 32.5 %      .9 fL      MCH 32.0 pg      MCHC 31.4 g/dL      RDW 15.5 %      RDW-SD 57.1 fl      MPV 11.1 fL      Platelets 187 10*3/mm3     Hemoglobin A1c [[id number]]  (Abnormal) Collected: 07/11/21 0329    Specimen: Blood Updated: 07/11/21 1334     Hemoglobin A1C 7.80 %     Narrative:      Hemoglobin A1C " "Ranges:    Increased Risk for Diabetes  5.7% to 6.4%  Diabetes                     >= 6.5%  Diabetic Goal                < 7.0%    Digoxin Level [[id number]]  (Abnormal) Collected: 07/11/21 0328    Specimen: Blood Updated: 07/11/21 0423     Digoxin 1.50 ng/mL     Procalcitonin [[id number]]  (Normal) Collected: 07/11/21 0328    Specimen: Blood Updated: 07/11/21 0423     Procalcitonin 0.06 ng/mL     Narrative:      As a Marker for Sepsis (Non-Neonates):     1. <0.5 ng/mL represents a low risk of severe sepsis and/or septic shock.  2. >2 ng/mL represents a high risk of severe sepsis and/or septic shock.    As a Marker for Lower Respiratory Tract Infections that require antibiotic therapy:  PCT on Admission     Antibiotic Therapy             6-12 Hrs later  >0.5                          Strongly Recommended            >0.25 - <0.5             Recommended  0.1 - 0.25                  Discouraged                       Remeasure/reassess PCT  <0.1                         Strongly Discouraged         Remeasure/reassess PCT      As 28 day mortality risk marker: \"Change in Procalcitonin Result\" (>80% or <=80%) if Day 0 (or Day 1) and Day 4 values are available. Refer to http://www.[employer]-pct-[email or web address]/    Change in PCT <=80 %   A decrease of PCT levels below or equal to 80% defines a positive change in PCT test result representing a higher risk for 28-day all-cause mortality of patients diagnosed with severe sepsis or septic shock.    Change in PCT >80 %   A decrease of PCT levels of more than 80% defines a negative change in PCT result representing a lower risk for 28-day all-cause mortality of patients diagnosed with severe sepsis or septic shock.                Comprehensive Metabolic Panel [[id number]]  (Abnormal) Collected: 07/11/21 0328    Specimen: Blood Updated: 07/11/21 0417     Glucose 395 mg/dL      BUN 25 mg/dL      Creatinine 1.39 mg/dL      Sodium 136 mmol/L      Potassium 5.1 mmol/L      Chloride 100 mmol/L  "     CO2 26.0 mmol/L      Calcium 8.4 mg/dL      Total Protein 6.4 g/dL      Albumin 3.60 g/dL      ALT (SGPT) 8 U/L      AST (SGOT) 11 U/L      Alkaline Phosphatase 55 U/L      Total Bilirubin 0.7 mg/dL      eGFR Non African Amer 50 mL/min/1.73      Globulin 2.8 gm/dL      A/G Ratio 1.3 g/dL      BUN/Creatinine Ratio 18.0     Anion Gap 10.0 mmol/L     Narrative:      GFR Normal >60  Chronic Kidney Disease <60  Kidney Failure <15      CBC & Differential [[id number]]  (Abnormal) Collected: 07/11/21 0329    Specimen: Blood Updated: 07/11/21 0356    Narrative:      The following orders were created for panel order CBC & Differential.  Procedure                               Abnormality         Status                     ---------                               -----------         ------                     CBC Auto Differential[[id number]]        Abnormal            Final result                 Please view results for these tests on the individual orders.    CBC Auto Differential [[id number]]  (Abnormal) Collected: 07/11/21 0329    Specimen: Blood Updated: 07/11/21 0356     WBC 12.54 10*3/mm3      RBC 3.45 10*6/mm3      Hemoglobin 11.1 g/dL      Hematocrit 35.2 %      .0 fL      MCH 32.2 pg      MCHC 31.5 g/dL      RDW 15.7 %      RDW-SD 57.7 fl      MPV 10.7 fL      Platelets 201 10*3/mm3      Neutrophil % 95.5 %      Lymphocyte % 2.1 %      Monocyte % 1.7 %      Eosinophil % 0.0 %      Basophil % 0.1 %      Immature Grans % 0.6 %      Neutrophils, Absolute 11.99 10*3/mm3      Lymphocytes, Absolute 0.26 10*3/mm3      Monocytes, Absolute 0.21 10*3/mm3      Eosinophils, Absolute 0.00 10*3/mm3      Basophils, Absolute 0.01 10*3/mm3      Immature Grans, Absolute 0.07 10*3/mm3      nRBC 0.0 /100 WBC     Basic Metabolic Panel [[id number]]  (Abnormal) Collected: 07/10/21 2214    Specimen: Blood Updated: 07/10/21 2238     Glucose 311 mg/dL      BUN 22 mg/dL      Creatinine 1.27 mg/dL      Sodium 136 mmol/L      Potassium 5.1  mmol/L      Chloride 100 mmol/L      CO2 25.0 mmol/L      Calcium 8.8 mg/dL      eGFR Non African Amer 55 mL/min/1.73      BUN/Creatinine Ratio 17.3     Anion Gap 11.0 mmol/L     Narrative:      GFR Normal >60  Chronic Kidney Disease <60  Kidney Failure <15      BNP [[id number]]  (Abnormal) Collected: 07/10/21 2214    Specimen: Blood Updated: 07/10/21 2237     proBNP 2,483.0 pg/mL     Narrative:      Among patients with dyspnea, NT-proBNP is highly sensitive for the detection of acute congestive heart failure. In addition NT-proBNP of <300 pg/ml effectively rules out acute congestive heart failure with 99% negative predictive value.    Results may be falsely decreased if patient taking Biotin.      Magnesium [[id number]]  (Normal) Collected: 07/10/21 2214    Specimen: Blood Updated: 07/10/21 2232     Magnesium 2.3 mg/dL     CBC & Differential [[id number]]  (Abnormal) Collected: 07/10/21 2214    Specimen: Blood Updated: 07/10/21 2220    Narrative:      The following orders were created for panel order CBC & Differential.  Procedure                               Abnormality         Status                     ---------                               -----------         ------                     CBC Auto Differential[[id number]]        Abnormal            Final result                 Please view results for these tests on the individual orders.    CBC Auto Differential [[id number]]  (Abnormal) Collected: 07/10/21 2214    Specimen: Blood Updated: 07/10/21 2220     WBC 10.23 10*3/mm3      RBC 3.65 10*6/mm3      Hemoglobin 11.6 g/dL      Hematocrit 37.3 %      .2 fL      MCH 31.8 pg      MCHC 31.1 g/dL      RDW 15.7 %      RDW-SD 57.7 fl      MPV 10.5 fL      Platelets 227 10*3/mm3      Neutrophil % 95.3 %      Lymphocyte % 2.8 %      Monocyte % 1.0 %      Eosinophil % 0.0 %      Basophil % 0.2 %      Immature Grans % 0.7 %      Neutrophils, Absolute 9.75 10*3/mm3      Lymphocytes, Absolute 0.29 10*3/mm3      Monocytes,  Absolute 0.10 10*3/mm3      Eosinophils, Absolute 0.00 10*3/mm3      Basophils, Absolute 0.02 10*3/mm3      Immature Grans, Absolute 0.07 10*3/mm3      nRBC 0.0 /100 WBC         Imaging Results (Last 72 Hours)     Procedure Component Value Units Date/Time    XR Chest 1 View [[id number]] Collected: 07/11/21 1433     Updated: 07/11/21 1437    Narrative:      EXAMINATION: XR CHEST 1 VW-     7/11/2021 2:20 PM CDT     HISTORY: pericardial effusion     1 view chest x-ray.  No comparison.     Enlarged heart with rounded configuration compatible with the provided  history of pericardial effusion.     Interstitial lung disease appears to be chronic though there is  nonvisualization of the left diaphragm behind the heart compatible with  infiltrate or atelectasis within the left lower lobe.     There is no pneumothorax or heart failure.     Summary:  1. Cardiac enlargement.  2. Retrocardiac left lower lobe infiltrate or atelectasis.  This report was finalized on 07/11/2021 14:34 by Dr. Rashaun Liu MD.             ASSESSMENT/PLAN       Examination and evaluation of wound(s) was performed.  Education provided about edema of bilateral lower extremity and wound management such as compression and smoking cessation.     DIAGNOSIS:   Arterial ulcerations of toes of right 2nd and 3rd toes and right great toe  Vascular insufficiency of bilateral lower extremities  Tobacco dependence  Obesity - BMI: 35.87      PLAN:     Start     Ordered    07/12/21 1800  Wound Care  2 Times Daily     Question Answer Comment   Wound Locations Right 2nd and 3rd toes and left great toe    Wound Care Instructions Paint wounds with betadine twice a day        07/12/21 1521    07/12/21 1523  Elevate Extremity  Continuous     Comments: Elevate bilateral lower extremities above heart level when possible.   Question:  Elevate Extremity  Answer:  BLE    07/12/21 1522    07/12/21 1520  Elevate Heels Off of Bed  Until Discontinued      07/12/21 1521                Discussed findings and treatment plan including risks, benefits, and treatment options with patient in detail. Patient agreed with treatment plan.      This document has been electronically signed by BOSTON Carrillo on 7/12/2021 15:16 CDT

## 2021-07-12 NOTE — PLAN OF CARE
Goal Outcome Evaluation:  Plan of Care Reviewed With: patient        Progress: no change  Outcome Summary: Pt on 3L NC. wound care has seen pt. Encouraged pt to elevate extremities. Vitals stable. Afib 70-99 per tele. Safety maintained.

## 2021-07-12 NOTE — CASE MANAGEMENT/SOCIAL WORK
Continued Stay Note   [facility]     Patient Name: Isamar Rodriguez  MRN: [id number]  Today's Date: 7/12/2021    Admit Date: 7/10/2021    Discharge Plan     Row Name 07/12/21 1051       Plan    Plan  Home    Patient/Family in Agreement with Plan  yes    Plan Comments  Pt states he resides home alone with his dog.  Pt has PCP, RX coverage and can afford medications.  Pt denies any dc needs and does not want hh services.  SW will follow.        Discharge Codes    No documentation.             ROSENDA ValenciaW

## 2021-07-12 NOTE — PROGRESS NOTES
[facility] -  Progress Note     LOS: 2 days   Patient Care Team:  Juvenal Wynn MD as PCP - General (Internal Medicine)    Chief Complaint    Subjective     Interval History: The patient is sitting up in bed watching TV.  He states that he feels like his breathing is better than when he got here although not to his baseline quite yet.  He denies any pain, discomfort.  His telemetry has been stable.  Atrial fibrillation, rate controlled 70-90s.  His echocardiogram has been completed and is stable.  He remains on supplemental oxygen.       Review of Systems:     Review of Systems   Constitutional: Positive for activity change.   HENT: Positive for congestion.    Respiratory: Positive for cough, shortness of breath and wheezing.    Cardiovascular: Negative for chest pain, palpitations and leg swelling.   Gastrointestinal: Negative for abdominal distention and abdominal pain.   Genitourinary: Negative for difficulty urinating.   Neurological: Negative for dizziness and weakness.   Psychiatric/Behavioral: Negative for confusion.     Objective     Vital Sign Min/Max for last 24 hours  Temp  Min: 97.4 °F (36.3 °C)  Max: 98.2 °F (36.8 °C)   BP  Min: 106/66  Max: 121/96   Pulse  Min: 75  Max: 95   Resp  Min: 16  Max: 16   SpO2  Min: 87 %  Max: 100 %   No data recorded   Weight  Min: 123 kg (271 lb 12.8 oz)  Max: 123 kg (271 lb 12.8 oz)         07/11/21 2039   Weight: 123 kg (271 lb 12.8 oz)       Physical Exam:    Vitals reviewed.   Constitutional:       Appearance: Well-developed, well-groomed and not in distress. Obese. Ill-appearing and chronically ill-appearing.      Interventions: Nasal cannula in place.   HENT:      Head: Normocephalic and atraumatic.   Pulmonary:      Effort: Pulmonary effort is normal.      Breath sounds: Scattered Wheezing present.   Chest:      Chest wall: Not tender to palpatation.   Cardiovascular:      Normal rate. Irregularly irregular rhythm.   Edema:     Peripheral  "edema present.     Pretibial: bilateral trace edema of the pretibial area.  Musculoskeletal:      Cervical back: Normal range of motion and neck supple. Skin:     General: Skin is dry.   Neurological:      Mental Status: Alert, oriented to person, place, and time and oriented to person, place and time.   Psychiatric:         Attention and Perception: Attention normal.         Mood and Affect: Mood normal.         Speech: Speech normal.         Behavior: Behavior normal. Behavior is cooperative.       Results Review:   Lab Results (last 72 hours)     Procedure Component Value Units Date/Time    Procalcitonin [[id number]]  (Normal) Collected: 07/12/21 0426    Specimen: Blood Updated: 07/12/21 0549     Procalcitonin 0.05 ng/mL     Narrative:      As a Marker for Sepsis (Non-Neonates):     1. <0.5 ng/mL represents a low risk of severe sepsis and/or septic shock.  2. >2 ng/mL represents a high risk of severe sepsis and/or septic shock.    As a Marker for Lower Respiratory Tract Infections that require antibiotic therapy:  PCT on Admission     Antibiotic Therapy             6-12 Hrs later  >0.5                          Strongly Recommended            >0.25 - <0.5             Recommended  0.1 - 0.25                  Discouraged                       Remeasure/reassess PCT  <0.1                         Strongly Discouraged         Remeasure/reassess PCT      As 28 day mortality risk marker: \"Change in Procalcitonin Result\" (>80% or <=80%) if Day 0 (or Day 1) and Day 4 values are available. Refer to http://www.[employer]-pct-calculator.com/    Change in PCT <=80 %   A decrease of PCT levels below or equal to 80% defines a positive change in PCT test result representing a higher risk for 28-day all-cause mortality of patients diagnosed with severe sepsis or septic shock.    Change in PCT >80 %   A decrease of PCT levels of more than 80% defines a negative change in PCT result representing a lower risk for 28-day all-cause " mortality of patients diagnosed with severe sepsis or septic shock.                Basic Metabolic Panel [[id number]]  (Abnormal) Collected: 07/12/21 0426    Specimen: Blood Updated: 07/12/21 0548     Glucose 224 mg/dL      BUN 26 mg/dL      Creatinine 1.23 mg/dL      Sodium 136 mmol/L      Potassium 4.8 mmol/L      Chloride 99 mmol/L      CO2 27.0 mmol/L      Calcium 8.4 mg/dL      eGFR Non African Amer 57 mL/min/1.73      BUN/Creatinine Ratio 21.1     Anion Gap 10.0 mmol/L     Narrative:      GFR Normal >60  Chronic Kidney Disease <60  Kidney Failure <15      Magnesium [[id number]]  (Normal) Collected: 07/12/21 0426    Specimen: Blood Updated: 07/12/21 0542     Magnesium 2.2 mg/dL     Digoxin Level [[id number]]  (Normal) Collected: 07/12/21 0426    Specimen: Blood Updated: 07/12/21 0542     Digoxin 0.70 ng/mL     CBC (No Diff) [[id number]]  (Abnormal) Collected: 07/12/21 0426    Specimen: Blood Updated: 07/12/21 0527     WBC 15.45 10*3/mm3      RBC 3.19 10*6/mm3      Hemoglobin 10.2 g/dL      Hematocrit 32.5 %      .9 fL      MCH 32.0 pg      MCHC 31.4 g/dL      RDW 15.5 %      RDW-SD 57.1 fl      MPV 11.1 fL      Platelets 187 10*3/mm3     Hemoglobin A1c [[id number]]  (Abnormal) Collected: 07/11/21 0329    Specimen: Blood Updated: 07/11/21 1334     Hemoglobin A1C 7.80 %     Narrative:      Hemoglobin A1C Ranges:    Increased Risk for Diabetes  5.7% to 6.4%  Diabetes                     >= 6.5%  Diabetic Goal                < 7.0%    Digoxin Level [[id number]]  (Abnormal) Collected: 07/11/21 0328    Specimen: Blood Updated: 07/11/21 0423     Digoxin 1.50 ng/mL     Procalcitonin [[id number]]  (Normal) Collected: 07/11/21 0328    Specimen: Blood Updated: 07/11/21 0423     Procalcitonin 0.06 ng/mL     Narrative:      As a Marker for Sepsis (Non-Neonates):     1. <0.5 ng/mL represents a low risk of severe sepsis and/or septic shock.  2. >2 ng/mL represents a high risk of severe sepsis and/or septic shock.    As a  "Marker for Lower Respiratory Tract Infections that require antibiotic therapy:  PCT on Admission     Antibiotic Therapy             6-12 Hrs later  >0.5                          Strongly Recommended            >0.25 - <0.5             Recommended  0.1 - 0.25                  Discouraged                       Remeasure/reassess PCT  <0.1                         Strongly Discouraged         Remeasure/reassess PCT      As 28 day mortality risk marker: \"Change in Procalcitonin Result\" (>80% or <=80%) if Day 0 (or Day 1) and Day 4 values are available. Refer to http://[email or web address]ct-calculator.com/    Change in PCT <=80 %   A decrease of PCT levels below or equal to 80% defines a positive change in PCT test result representing a higher risk for 28-day all-cause mortality of patients diagnosed with severe sepsis or septic shock.    Change in PCT >80 %   A decrease of PCT levels of more than 80% defines a negative change in PCT result representing a lower risk for 28-day all-cause mortality of patients diagnosed with severe sepsis or septic shock.                Comprehensive Metabolic Panel [[id number]]  (Abnormal) Collected: 07/11/21 0328    Specimen: Blood Updated: 07/11/21 0417     Glucose 395 mg/dL      BUN 25 mg/dL      Creatinine 1.39 mg/dL      Sodium 136 mmol/L      Potassium 5.1 mmol/L      Chloride 100 mmol/L      CO2 26.0 mmol/L      Calcium 8.4 mg/dL      Total Protein 6.4 g/dL      Albumin 3.60 g/dL      ALT (SGPT) 8 U/L      AST (SGOT) 11 U/L      Alkaline Phosphatase 55 U/L      Total Bilirubin 0.7 mg/dL      eGFR Non African Amer 50 mL/min/1.73      Globulin 2.8 gm/dL      A/G Ratio 1.3 g/dL      BUN/Creatinine Ratio 18.0     Anion Gap 10.0 mmol/L     Narrative:      GFR Normal >60  Chronic Kidney Disease <60  Kidney Failure <15      CBC & Differential [[id number]]  (Abnormal) Collected: 07/11/21 0329    Specimen: Blood Updated: 07/11/21 0356    Narrative:      The following orders were created for panel " order CBC & Differential.  Procedure                               Abnormality         Status                     ---------                               -----------         ------                     CBC Auto Differential[[id number]]        Abnormal            Final result                 Please view results for these tests on the individual orders.    CBC Auto Differential [[id number]]  (Abnormal) Collected: 07/11/21 0329    Specimen: Blood Updated: 07/11/21 0356     WBC 12.54 10*3/mm3      RBC 3.45 10*6/mm3      Hemoglobin 11.1 g/dL      Hematocrit 35.2 %      .0 fL      MCH 32.2 pg      MCHC 31.5 g/dL      RDW 15.7 %      RDW-SD 57.7 fl      MPV 10.7 fL      Platelets 201 10*3/mm3      Neutrophil % 95.5 %      Lymphocyte % 2.1 %      Monocyte % 1.7 %      Eosinophil % 0.0 %      Basophil % 0.1 %      Immature Grans % 0.6 %      Neutrophils, Absolute 11.99 10*3/mm3      Lymphocytes, Absolute 0.26 10*3/mm3      Monocytes, Absolute 0.21 10*3/mm3      Eosinophils, Absolute 0.00 10*3/mm3      Basophils, Absolute 0.01 10*3/mm3      Immature Grans, Absolute 0.07 10*3/mm3      nRBC 0.0 /100 WBC     Basic Metabolic Panel [[id number]]  (Abnormal) Collected: 07/10/21 2214    Specimen: Blood Updated: 07/10/21 2238     Glucose 311 mg/dL      BUN 22 mg/dL      Creatinine 1.27 mg/dL      Sodium 136 mmol/L      Potassium 5.1 mmol/L      Chloride 100 mmol/L      CO2 25.0 mmol/L      Calcium 8.8 mg/dL      eGFR Non African Amer 55 mL/min/1.73      BUN/Creatinine Ratio 17.3     Anion Gap 11.0 mmol/L     Narrative:      GFR Normal >60  Chronic Kidney Disease <60  Kidney Failure <15      BNP [[id number]]  (Abnormal) Collected: 07/10/21 2214    Specimen: Blood Updated: 07/10/21 2237     proBNP 2,483.0 pg/mL     Narrative:      Among patients with dyspnea, NT-proBNP is highly sensitive for the detection of acute congestive heart failure. In addition NT-proBNP of <300 pg/ml effectively rules out acute congestive heart failure with  99% negative predictive value.    Results may be falsely decreased if patient taking Biotin.      Magnesium [[id number]]  (Normal) Collected: 07/10/21 2214    Specimen: Blood Updated: 07/10/21 2232     Magnesium 2.3 mg/dL     CBC & Differential [[id number]]  (Abnormal) Collected: 07/10/21 2214    Specimen: Blood Updated: 07/10/21 2220    Narrative:      The following orders were created for panel order CBC & Differential.  Procedure                               Abnormality         Status                     ---------                               -----------         ------                     CBC Auto Differential[[id number]]        Abnormal            Final result                 Please view results for these tests on the individual orders.    CBC Auto Differential [[id number]]  (Abnormal) Collected: 07/10/21 2214    Specimen: Blood Updated: 07/10/21 2220     WBC 10.23 10*3/mm3      RBC 3.65 10*6/mm3      Hemoglobin 11.6 g/dL      Hematocrit 37.3 %      .2 fL      MCH 31.8 pg      MCHC 31.1 g/dL      RDW 15.7 %      RDW-SD 57.7 fl      MPV 10.5 fL      Platelets 227 10*3/mm3      Neutrophil % 95.3 %      Lymphocyte % 2.8 %      Monocyte % 1.0 %      Eosinophil % 0.0 %      Basophil % 0.2 %      Immature Grans % 0.7 %      Neutrophils, Absolute 9.75 10*3/mm3      Lymphocytes, Absolute 0.29 10*3/mm3      Monocytes, Absolute 0.10 10*3/mm3      Eosinophils, Absolute 0.00 10*3/mm3      Basophils, Absolute 0.02 10*3/mm3      Immature Grans, Absolute 0.07 10*3/mm3      nRBC 0.0 /100 WBC     Respiratory Panel, PCR (WITHOUT COVID) - Swab, Nasopharynx [[id number]]  (Normal) Collected: 07/10/21 1806    Specimen: Swab from Nasopharynx Updated: 07/10/21 1931     ADENOVIRUS, PCR Not Detected     Coronavirus 229E Not Detected     Coronavirus HKU1 Not Detected     Coronavirus NL63 Not Detected     Coronavirus OC43 Not Detected     Human Metapneumovirus Not Detected     Human Rhinovirus/Enterovirus Not Detected     Influenza B  PCR Not Detected     Parainfluenza Virus 1 Not Detected     Parainfluenza Virus 2 Not Detected     Parainfluenza Virus 3 Not Detected     Parainfluenza Virus 4 Not Detected     Bordetella pertussis pcr Not Detected     Chlamydophila pneumoniae PCR Not Detected     Mycoplasma pneumo by PCR Not Detected     Influenza A PCR Not Detected     RSV, PCR Not Detected     Bordetella parapertussis PCR Not Detected    Narrative:      The coronavirus on the RVP is NOT COVID-19 and is NOT indicative of infection with COVID-19.    In the setting of a positive respiratory panel with a viral infection PLUS a negative procalcitonin without other underlying concern for bacterial infection, consider observing off antibiotics or discontinuation of antibiotics and continue supportive care. If the respiratory panel is positive for atypical bacterial infection (Bordetella pertussis, Chlamydophila pneumoniae, or Mycoplasma pneumoniae), consider antibiotic de-escalation to target atypical bacterial infection.          Medication Review: yes  Current Facility-Administered Medications   Medication Dose Route Frequency Provider Last Rate Last Admin   • acetaminophen (TYLENOL) tablet 650 mg  650 mg Oral Q4H PRN Adam Tay MD   650 mg at 07/11/21 1201   • atorvastatin (LIPITOR) tablet 40 mg  40 mg Oral Nightly Adam Tay MD   40 mg at 07/11/21 2134   • cefTRIAXone (ROCEPHIN) 1 g/100 mL 0.9% NS (MBP)  1 g Intravenous Q24H Adam Tay MD   1 g at 07/11/21 1714   • cyclobenzaprine (FLEXERIL) tablet 5 mg  5 mg Oral TID PRN Cristobal Burch MD   5 mg at 07/11/21 2134   • digoxin (LANOXIN) tablet 125 mcg  125 mcg Oral Daily Adam Tay MD   125 mcg at 07/12/21 1223   • dilTIAZem (CARDIZEM) 125 mg in 125 mL NS infusion  5-15 mg/hr Intravenous Titrated Adam Tay MD       • doxycycline (ADOXA) tablet 100 mg  100 mg Oral Q12H Adam Tay MD   100 mg at 07/12/21 0847   • enalapril (VASOTEC)  tablet 5 mg  5 mg Oral Nightly Adam Tay MD       • famotidine (PEPCID) tablet 20 mg  20 mg Oral BID AC Adam Tay MD   20 mg at 07/12/21 0847   • guaiFENesin (MUCINEX) 12 hr tablet 1,200 mg  1,200 mg Oral Q12H Adam Tay MD   1,200 mg at 07/12/21 0847   • ipratropium (ATROVENT) nebulizer solution 0.5 mg  0.5 mg Nebulization 4x Daily - RT Cristobal Burch MD   0.5 mg at 07/12/21 1036   • LORazepam (ATIVAN) tablet 0.5 mg  0.5 mg Oral Q2H PRN Cristobal Burch MD        Or   • LORazepam (ATIVAN) injection 0.5 mg  0.5 mg Intravenous Q2H PRN Cristobal Burch MD   0.5 mg at 07/11/21 1826    Or   • LORazepam (ATIVAN) tablet 1 mg  1 mg Oral Q1H PRN Cristobal Burch MD        Or   • LORazepam (ATIVAN) injection 1 mg  1 mg Intravenous Q1H PRN Cristobal Burch MD   1 mg at 07/11/21 2137    Or   • LORazepam (ATIVAN) injection 1 mg  1 mg Intravenous Q15 Min PRN Cristobal Burch MD        Or   • LORazepam (ATIVAN) injection 1 mg  1 mg Intramuscular Q15 Min PRN Cristobal Burch MD       • methylPREDNISolone sodium succinate (SOLU-Medrol) injection 40 mg  40 mg Intravenous Q8H Adam Tay MD   40 mg at 07/12/21 0902   • metoprolol tartrate (LOPRESSOR) tablet 50 mg  50 mg Oral Q12H Adam Tay MD   50 mg at 07/12/21 0847   • nicotine (NICODERM CQ) 21 MG/24HR patch 1 patch  1 patch Transdermal Q24H Adam Tay MD   1 patch at 07/12/21 0847   • ondansetron (ZOFRAN) injection 4 mg  4 mg Intravenous Q6H PRN Adam Tay MD       • rivaroxaban (XARELTO) tablet 20 mg  20 mg Oral Daily With Dinner Adam Tay MD   20 mg at 07/11/21 1714   • sodium chloride 0.9 % flush 10 mL  10 mL Intravenous Q12H Adam Tay MD   10 mL at 07/12/21 0852   • sodium chloride 0.9 % flush 10 mL  10 mL Intravenous PRN Adam Tay MD       • tamsulosin (FLOMAX) 24 hr capsule 0.4 mg  0.4 mg Oral Daily Adam Tay MD   0.4 mg at  07/12/21 0847   • venlafaxine XR (EFFEXOR-XR) 24 hr capsule 75 mg  75 mg Oral Daily Adam Tay MD   75 mg at 07/12/21 0847     Results for orders placed during the hospital encounter of 07/10/21    Adult Transthoracic Echo Complete W/ Cont if Necessary Per Protocol    Interpretation Summary  · Left ventricular systolic function is normal. Left ventricular ejection fraction appears to be 61 - 65%.  · Normal right ventricular cavity size and systolic function noted.  · Cardiac valves are poorly visualized, however no obvious abnormalities are noted by Doppler assessment.  · There is a moderate (1-2cm) pericardial effusion without echocardiographic evidence for tamponade.        Assessment/Plan     1.  Atrial fibrillation: Now Rate controlled.  Chronicity unknown.  Stable  2.  Pericardial effusion: Previously documented from June 2020.  Echocardiogram is complete with a moderate (1-2 cm) pericardial effusion without echocardiographic evidence for tamponade.  Chronic, stable finding.  3.  Acute on chronic hypoxic respiratory failure: Improved but remains on supplemental oxygen.  4.  COPD exacerbation: Breathing treatments, antibiotics, steroids, expectorant  5.  Essential hypertension: Stable  6.  Diabetes mellitus: Hemoglobin A1c 7.8%  7.  Mixed hyperlipidemia  8.  Peripheral vascular disease  9.  CKD  10.  Tobacco abuse    -Continue current medications for rate control -digoxin and Lopressor which were home medications  -Continue anticoagulation with Xarelto which was a home medication  -Pericardial effusion, moderate in size, stable without chest pain.  This has been previously diagnosed.  No symptoms of pericarditis and no evidence of tamponade.  After discharge he should follow-up with Dr. Wynn.  -No further cardiac work-up during this hospitalization.  Continue ongoing treatment of shortness of breath per primary team.  -Cardiology will sign off, please recall if needed.      Electronically signed by  Vane Ng, APRN, 07/12/21, 2:41 PM CDT.

## 2021-07-13 VITALS
WEIGHT: 274 LBS | RESPIRATION RATE: 16 BRPM | BODY MASS INDEX: 36.31 KG/M2 | DIASTOLIC BLOOD PRESSURE: 83 MMHG | HEART RATE: 86 BPM | HEIGHT: 73 IN | OXYGEN SATURATION: 95 % | SYSTOLIC BLOOD PRESSURE: 109 MMHG | TEMPERATURE: 97.8 F

## 2021-07-13 PROBLEM — J96.01 ACUTE RESPIRATORY FAILURE WITH HYPOXIA (HCC): Status: ACTIVE | Noted: 2021-07-13

## 2021-07-13 LAB
GLUCOSE BLDC GLUCOMTR-MCNC: 206 MG/DL (ref 70–130)
GLUCOSE BLDC GLUCOMTR-MCNC: 220 MG/DL (ref 70–130)
GLUCOSE BLDC GLUCOMTR-MCNC: 292 MG/DL (ref 70–130)

## 2021-07-13 PROCEDURE — 94799 UNLISTED PULMONARY SVC/PX: CPT

## 2021-07-13 PROCEDURE — 25010000002 METHYLPREDNISOLONE PER 40 MG: Performed by: NURSE PRACTITIONER

## 2021-07-13 PROCEDURE — 63710000001 INSULIN LISPRO (HUMAN) PER 5 UNITS: Performed by: NURSE PRACTITIONER

## 2021-07-13 PROCEDURE — 82962 GLUCOSE BLOOD TEST: CPT

## 2021-07-13 PROCEDURE — 94618 PULMONARY STRESS TESTING: CPT

## 2021-07-13 PROCEDURE — 97161 PT EVAL LOW COMPLEX 20 MIN: CPT | Performed by: PHYSICAL THERAPIST

## 2021-07-13 RX ORDER — PREDNISONE 10 MG/1
TABLET ORAL
Qty: 30 TABLET | Refills: 0 | Status: SHIPPED | OUTPATIENT
Start: 2021-07-13 | End: 2021-07-25

## 2021-07-13 RX ORDER — CEFDINIR 300 MG/1
300 CAPSULE ORAL 2 TIMES DAILY
Qty: 8 CAPSULE | Refills: 0 | Status: SHIPPED | OUTPATIENT
Start: 2021-07-13 | End: 2021-07-17

## 2021-07-13 RX ORDER — GUAIFENESIN 600 MG/1
1200 TABLET, EXTENDED RELEASE ORAL EVERY 12 HOURS SCHEDULED
Qty: 20 TABLET | Refills: 0 | Status: SHIPPED | OUTPATIENT
Start: 2021-07-13 | End: 2021-07-18

## 2021-07-13 RX ORDER — NICOTINE 21 MG/24HR
1 PATCH, TRANSDERMAL 24 HOURS TRANSDERMAL
Qty: 30 PATCH | Refills: 0 | Status: SHIPPED | OUTPATIENT
Start: 2021-07-14 | End: 2021-08-13

## 2021-07-13 RX ORDER — DOXYCYCLINE HYCLATE 100 MG/1
100 CAPSULE ORAL 2 TIMES DAILY
Qty: 8 CAPSULE | Refills: 0 | Status: SHIPPED | OUTPATIENT
Start: 2021-07-13 | End: 2021-07-17

## 2021-07-13 RX ADMIN — TAMSULOSIN HYDROCHLORIDE 0.4 MG: 0.4 CAPSULE ORAL at 08:32

## 2021-07-13 RX ADMIN — DIGOXIN 125 MCG: 125 TABLET ORAL at 11:31

## 2021-07-13 RX ADMIN — METHYLPREDNISOLONE SODIUM SUCCINATE 40 MG: 40 INJECTION, POWDER, FOR SOLUTION INTRAMUSCULAR; INTRAVENOUS at 08:32

## 2021-07-13 RX ADMIN — IPRATROPIUM BROMIDE 0.5 MG: 0.5 SOLUTION RESPIRATORY (INHALATION) at 06:56

## 2021-07-13 RX ADMIN — DOXYCYCLINE 100 MG: 100 TABLET ORAL at 08:32

## 2021-07-13 RX ADMIN — METOPROLOL TARTRATE 50 MG: 50 TABLET, FILM COATED ORAL at 08:32

## 2021-07-13 RX ADMIN — INSULIN LISPRO 6 UNITS: 100 INJECTION, SOLUTION INTRAVENOUS; SUBCUTANEOUS at 11:31

## 2021-07-13 RX ADMIN — GUAIFENESIN 1200 MG: 600 TABLET, EXTENDED RELEASE ORAL at 08:32

## 2021-07-13 RX ADMIN — INSULIN LISPRO 4 UNITS: 100 INJECTION, SOLUTION INTRAVENOUS; SUBCUTANEOUS at 08:32

## 2021-07-13 RX ADMIN — NICOTINE 1 PATCH: 21 PATCH, EXTENDED RELEASE TRANSDERMAL at 08:32

## 2021-07-13 RX ADMIN — IPRATROPIUM BROMIDE 0.5 MG: 0.5 SOLUTION RESPIRATORY (INHALATION) at 09:30

## 2021-07-13 RX ADMIN — FAMOTIDINE 20 MG: 20 TABLET, FILM COATED ORAL at 08:32

## 2021-07-13 RX ADMIN — VENLAFAXINE HYDROCHLORIDE 75 MG: 75 CAPSULE, EXTENDED RELEASE ORAL at 08:32

## 2021-07-13 NOTE — PROGRESS NOTES
Exercise Oximetry    Patient Name:Isamar Rodriguez   MRN: [id number]   Date: 07/13/21             ROOM AIR BASELINE   SpO2%       86   Heart Rate     82   Blood Pressure      EXERCISE ON ROOM AIR SpO2% EXERCISE ON O2 @ 3 LPM SpO2%   1 MINUTE  1 MINUTE       92   2 MINUTES  2 MINUTES       91   3 MINUTES  3 MINUTES       91   4 MINUTES  4 MINUTES    5 MINUTES  5 MINUTES    6 MINUTES  6 MINUTES               Distance Walked   Distance Walked   Dyspnea (Camila Scale)   Dyspnea (Camila Scale)   Fatigue (Camila Scale)   Fatigue (Camila Scale)   SpO2% Post Exercise   SpO2% Post Exercise      94   HR Post Exercise   HR Post Exercise            102   Time to Recovery   Time to Recovery      Less than 1 minute     Comments:   Pt on room air when I came in the room and sat 86%.  Placed pt on 2 lpm nc and sat increased to 90%.  When he started to walk his sat immediately dropped to 88%.  Increased oxygen to 3 lpm and waited until sat increased to 94%.  Walked around room for 200 feet and sat dropped to 91% at the lowest.

## 2021-07-13 NOTE — PLAN OF CARE
Goal Outcome Evaluation:           Progress: improving   VSS. No C/O pain. Aflutter 66-99 with a 2.12 pause on tele. AAOX4. Continues on anticoagulant. Activity encouraged. Continues on antibiotic. Anticipating possible discharge soon. Continues on 3L of O2 nasal cannula with saturation in the 90s. Medication education provided. Safety maintained.

## 2021-07-13 NOTE — CASE MANAGEMENT/SOCIAL WORK
Continued Stay Note  [facility]     Patient Name: Isamar Rodriguez  MRN: [id number]  Today's Date: 7/13/2021    Admit Date: 7/10/2021    Discharge Plan     Row Name 07/13/21 1114       Plan    Plan  Home    Provided Post Acute Provider List?  Yes    Post Acute Provider List  DME Supplier    Delivered To  Patient    Method of Delivery  Telephone    Patient/Family in Agreement with Plan  yes    Final Discharge Disposition Code  01 - home or self-care    Final Note  Referral for home 02.  Pt was provided options and chose [facility].  SW spoke to Elena at [facility] in [place] who will bring portable tank to pt's room.  Then a worker from their [place] office will setup concentrator at pt's [facility].        Discharge Codes    No documentation.       Expected Discharge Date and Time     Expected Discharge Date Expected Discharge Time    Jul 13, 2021             JOSR Valencia

## 2021-07-13 NOTE — PLAN OF CARE
Goal Outcome Evaluation:  Plan of Care Reviewed With: (P) patient        Progress: (P) no change  Outcome Summary: (P) PT eval completed. Pt AAOx4 sitting EOB upon arrival. 3L O2 NC. No c/o of pain or SOB at rest. Demos sit<>stand w/ sup and amb 200x2 with CGAx1 and walking stick. Pt tends to reach and grab for room furniture and door frames for stability. Carries walking stick in hand but does not place on the ground for support 50% of the time. Amb with B decreased ankle DF and mild steppage gait. Mild instability during amb but corrects ind. Pt has no c/o increasing SOB during activity. 4/5 B ankle DF with absent sensation to L4-S1 derms B. Pt left sitting EOB for breakfasst. Call light in reach. PT indicated to improve balance with amb, AD safety training  prior to d/c home. D/c rec home with assist.

## 2021-07-13 NOTE — THERAPY EVALUATION
Patient Name: Isamar Rodriguez  : 1945    MRN: [id number]                              Today's Date: 2021       Admit Date: 7/10/2021    Visit Dx:     ICD-10-CM ICD-9-CM   1. Acute respiratory failure with hypoxia (CMS/HCC)  J96.01 518.81   2. Peripheral vascular disease (CMS/HCC)  I73.9 443.9     Patient Active Problem List   Diagnosis   • Bilateral low back pain without sciatica   • Chronic left shoulder pain   • Chronic pain disorder   • Elevated prostate specific antigen (PSA)   • Essential hypertension   • IGT (impaired glucose tolerance)   • Long term current use of anticoagulant therapy   • Mixed hyperlipidemia   • Neuropathy   • Prostate cancer screening   • Smoking addiction   • Hammer toes of both feet   • Pain in toes of both feet   • Pericardial effusion   • Tobacco dependence   • COPD (chronic obstructive pulmonary disease) (CMS/HCC)   • Atrial fibrillation (CMS/HCC) with rapid ventricular response   • Essential hypertension   • Pulmonary infiltrate   • Obesity (BMI 30-39.9)   • Hypoxemia   • Type 2 diabetes mellitus with hyperglycemia (CMS/HCC)   • Acute respiratory failure with hypoxia (CMS/HCC)     Past Medical History:   Diagnosis Date   • Anxiety and depression    • Arthritis     hands, knees, ankles   • Bilateral foot pain    • Cataract    • Chronic atrial fibrillation (CMS/HCC)    • Concussion    • COPD (chronic obstructive pulmonary disease) (CMS/HCC)    • Hypertension      Past Surgical History:   Procedure Laterality Date   • AMPUTATION DIGIT Bilateral 1/15/2020    Procedure: Fourth toe amputation, second and third hammertoe correction;  Surgeon: Darryl Sellers DPM;  Location: [facility];  Service: Podiatry   • CARDIAC CATHETERIZATION     • CERVICAL FUSION     • COLONOSCOPY     • ENDOSCOPY     • EYE SURGERY Bilateral     cataracts removed with implants   • FOREARM SURGERY Right    • HERNIA REPAIR Bilateral    • TONSILLECTOMY       General Information     Row Name  07/13/21 0746          Physical Therapy Time and Intention    Document Type  evaluation CC: hypoxemia, pericardial effusion. PMH: a-fib, COPD, HTN, cardiac cath 2006. Dx: pericardial effusion  -SB (r) HW (t) SB (c)     Mode of Treatment  physical therapy  -SB (r) HW (t) SB (c)     Row Name 07/13/21 0746          General Information    Patient Profile Reviewed  yes  -SB (r) HW (t) SB (c)     Prior Level of Function  independent:;ADL's;home management;gait;transfer;community mobility;all household mobility;driving  -SB (r) HW (t) SB (c)     Existing Precautions/Restrictions  fall;oxygen therapy device and L/min 3L  -SB (r) HW (t) SB (c)     Barriers to Rehab  previous functional deficit  -SB (r) HW (t) SB (c)     Row Name 07/13/21 0746          Living Environment    Lives With  alone walk in shower, uses trek stick during amb  -SB (r) HW (t) SB (c)     Row Name 07/13/21 0746          Home Main Entrance    Number of Stairs, Main Entrance  none ramp  -SB (r) HW (t) SB (c)     Stair Railings, Main Entrance  none  -SB (r) HW (t) SB (c)     Row Name 07/13/21 0746          Stairs Within Home, Primary    Number of Stairs, Within Home, Primary  none  -SB (r) HW (t) SB (c)     Stair Railings, Within Home, Primary  none  -SB (r) HW (t) SB (c)     Row Name 07/13/21 0746          Cognition    Orientation Status (Cognition)  oriented x 4  -SB (r) HW (t) SB (c)     Row Name 07/13/21 0746          Safety Issues, Functional Mobility    Impairments Affecting Function (Mobility)  balance;shortness of breath;endurance/activity tolerance  -SB (r) HW (t) SB (c)       User Key  (r) = Recorded By, (t) = Taken By, (c) = Cosigned By    Initials Name Provider Type    SB Chelsie Day, PT DPT Physical Therapist    Sussy Almanzar, PT Student PT Student        Mobility     Row Name 07/13/21 0746          Bed Mobility    Comment (Bed Mobility)  pt sitting EOB upon arrival  -SB (r) HW (t) SB (c)     Row Name 07/13/21 0746          Sit-Stand  Transfer    Sit-Stand [place] (Transfers)  supervision  -SB (r) HW (t) SB (c)     Assistive Device (Sit-Stand Transfers)  cane, straight  -SB (r) HW (t) SB (c)     Row Name 07/13/21 0746          Gait/Stairs (Locomotion)    [place] Level (Gait)  contact guard;1 person assist  -SB (r) HW (t) SB (c)     Assistive Device (Gait)  cane, straight  -SB (r) HW (t) SB (c)     Distance in Feet (Gait)  200x2  -SB (r) HW (t) SB (c)     Deviations/Abnormal Patterns (Gait)  stride length decreased;base of support, wide;weight shifting decreased;bilateral deviations  -SB (r) HW (t) SB (c)     Bilateral Gait Deviations  foot drop/toe drag;heel strike decreased  -SB (r) HW (t) SB (c)       User Key  (r) = Recorded By, (t) = Taken By, (c) = Cosigned By    Initials Name Provider Type    SB Chelsie Day, PT DPT Physical Therapist    HW Sussy Solorio PT Student PT Student        Obj/Interventions     Row Name 07/13/21 0746          Range of Motion Comprehensive    General Range of Motion  bilateral lower extremity ROM WFL  -SB (r) HW (t) SB (c)     Comment, General Range of Motion  BLE AROM WFL  -SB (r) HW (t) SB (c)     Row Name 07/13/21 0746          Strength Comprehensive (MMT)    General Manual Muscle Testing (MMT) Assessment  lower extremity strength deficits identified  -SB (r) HW (t) SB (c)     Comment, General Manual Muscle Testing (MMT) Assessment  B hip flex and knee ext 5/5, B ankle DF 4/5  -SB (r) HW (t) SB (c)     Row Name 07/13/21 0746          Balance    Balance Assessment  sitting static balance;sitting dynamic balance;standing static balance;standing dynamic balance  -SB (r) HW (t) SB (c)     Static Sitting Balance  WFL;sitting, edge of bed;unsupported  -SB (r) HW (t) SB (c)     Dynamic Sitting Balance  WFL;sitting, edge of bed;unsupported  -SB (r) HW (t) SB (c)     Static Standing Balance  WFL;standing;supported  -SB (r) HW (t) SB (c)     Dynamic Standing Balance  mild impairment;standing;unsupported   -SB (r) HW (t) SB (c)     Comment, Balance  pt tends to hold onto room furniture and carrys his walking stick without placing it on the ground  -SB (r) HW (t) SB (c)     Row Name 07/13/21 0746          Sensory Assessment (Somatosensory)    Sensory Assessment (Somatosensory)  LE sensation intact;other (see comments) B L4-L5-S1 derm absent  -SB (r) HW (t) SB (c)       User Key  (r) = Recorded By, (t) = Taken By, (c) = Cosigned By    Initials Name Provider Type    Chelsie Be, PT DPT Physical Therapist    HW Sussy Solorio, PT Student PT Student        Goals/Plan     Row Name 07/13/21 0746          Bed Mobility Goal 1 (PT)    Activity/Assistive Device (Bed Mobility Goal 1, PT)  rolling to left;rolling to right;scooting;sit to supine/supine to sit;sidelying to sit/sit to sidelying  -SB (r) HW (t) SB (c)     [place] Level/Cues Needed (Bed Mobility Goal 1, PT)  independent  -SB (r) HW (t) SB (c)     Time Frame (Bed Mobility Goal 1, PT)  long term goal (LTG)  -SB (r) HW (t) SB (c)     Progress/Outcomes (Bed Mobility Goal 1, PT)  goal ongoing  -SB (r) HW (t) SB (c)     Row Name 07/13/21 0746          Transfer Goal 1 (PT)    Activity/Assistive Device (Transfer Goal 1, PT)  sit-to-stand/stand-to-sit;bed-to-chair/chair-to-bed  -SB (r) HW (t) SB (c)     [place] Level/Cues Needed (Transfer Goal 1, PT)  independent  -SB (r) HW (t) SB (c)     Time Frame (Transfer Goal 1, PT)  long term goal (LTG)  -SB (r) HW (t) SB (c)     Progress/Outcome (Transfer Goal 1, PT)  goal ongoing  -SB (r) HW (t) SB (c)     Row Name 07/13/21 0746          Gait Training Goal 1 (PT)    Activity/Assistive Device (Gait Training Goal 1, PT)  gait (walking locomotion);assistive device use;decrease fall risk;diminish gait deviation;increase energy conservation;increase endurance/gait distance;improve balance and speed;normalize weight shifts;forward stepping;sidestepping;turning, left;turning, right;cane, straight  -SB (r) HW (t) SB (c)      [place] Level (Gait Training Goal 1, PT)  modified independence  -SB (r) HW (t) SB (c)     Distance (Gait Training Goal 1, PT)  350x2  -SB (r) HW (t) SB (c)     Time Frame (Gait Training Goal 1, PT)  long term goal (LTG)  -SB (r) HW (t) SB (c)     Progress/Outcome (Gait Training Goal 1, PT)  goal ongoing  -SB (r) HW (t) SB (c)       User Key  (r) = Recorded By, (t) = Taken By, (c) = Cosigned By    Initials Name Provider Type    SB Chelsie Day, PT DPT Physical Therapist    HW Sussy Solorio, PT Student PT Student        Clinical Impression     Row Name 07/13/21 0713          Pain    Additional Documentation  Pain Scale: Numbers Pre/Post-Treatment (Group)  -SB (r) HW (t) SB (c)     Row Name 07/13/21 0751          Pain Scale: Numbers Pre/Post-Treatment    Pretreatment Pain Rating  0/10 - no pain  -SB (r) HW (t) SB (c)     Posttreatment Pain Rating  0/10 - no pain  -SB (r) HW (t) SB (c)     Pain Intervention(s)  Repositioned;Ambulation/increased activity  -SB (r) HW (t) SB (c)     Row Name 07/13/21 0789          Plan of Care Review    Plan of Care Reviewed With  patient  -SB (r) HW (t) SB (c)     Progress  no change  -SB (r) HW (t) SB (c)     Outcome Summary  PT eval completed. Pt AAOx4 sitting EOB upon arrival. 3L O2 NC. No c/o of pain or SOB at rest. Demos sit<>stand w/ sup and amb 200x2 with CGAx1 and walking stick. Pt tends to reach and grab for room furniture and door frames for stability. Carries walking stick in hand but does not place on the ground for support 50% of the time. Amb with B decreased ankle DF and mild steppage gait. Mild instability during amb but corrects ind. Pt has no c/o increasing SOB during activity. 4/5 B ankle DF with absent sensation to L4-S1 derms B. Pt left sitting EOB for breakfasst. Call light in reach. PT indicated to improve balance with amb, AD safety training  prior to d/c home. D/c rec home with assist.  -SB (r) HW (t) SB (c)     Row Name 07/13/21 0746          Therapy  Assessment/Plan (PT)    Patient/Family Therapy Goals Statement (PT)  return to PLOF  -SB (r) HW (t) SB (c)     Rehab Potential (PT)  good, to achieve stated therapy goals  -SB (r) HW (t) SB (c)     Criteria for Skilled Interventions Met (PT)  yes;meets criteria;skilled treatment is necessary  -SB (r) HW (t) SB (c)     Predicted Duration of Therapy Intervention (PT)  until d/c or goals met  -SB (r) HW (t) SB (c)     Row Name 07/13/21 0746          Vital Signs    O2 Delivery Pre Treatment  nasal cannula 3L  -SB (r) HW (t) SB (c)     O2 Delivery Intra Treatment  nasal cannula 3L  -SB (r) HW (t) SB (c)     O2 Delivery Post Treatment  nasal cannula 3L  -SB (r) HW (t) SB (c)     Pre Patient Position  Sitting EOB  -SB (r) HW (t) SB (c)     Intra Patient Position  Standing  -SB (r) HW (t) SB (c)     Post Patient Position  Sitting  -SB (r) HW (t) SB (c)     Row Name 07/13/21 0746          Positioning and Restraints    Pre-Treatment Position  in bed  -SB (r) HW (t) SB (c)     Post Treatment Position  bed  -SB (r) HW (t) SB (c)     In Bed  sitting EOB;encouraged to call for assist;call light within reach  -SB (r) HW (t) SB (c)       User Key  (r) = Recorded By, (t) = Taken By, (c) = Cosigned By    Initials Name Provider Type    SB Chelsie Day, PT DPT Physical Therapist    HW Sussy Solorio, PT Student PT Student        Outcome Measures     Row Name 07/13/21 0746          How much help from another person do you currently need...    Turning from your back to your side while in flat bed without using bedrails?  4  -SB (r) HW (t) SB (c)     Moving from lying on back to sitting on the side of a flat bed without bedrails?  4  -SB (r) HW (t) SB (c)     Standing up from a chair using your arms (e.g., wheelchair, bedside chair)?  4  -SB (r) HW (t) SB (c)     Climbing 3-5 steps with a railing?  3  -SB (r) HW (t) SB (c)     To walk in hospital room?  3  -SB (r) HW (t) SB (c)     Row Name 07/13/21 0736          Functional Assessment     Outcome Measure Options  AM-PAC 6 Clicks Basic Mobility (PT)  -SB (r) HW (t) SB (c)       User Key  (r) = Recorded By, (t) = Taken By, (c) = Cosigned By    Initials Name Provider Type    SB Chelsie Day, PT DPT Physical Therapist     Sussy Solorio, PT Student PT Student        Physical Therapy Education                 Title: PT OT SLP Therapies (Resolved)     Topic: Physical Therapy (Resolved)     Point: Mobility training (Resolved)     Learning Progress Summary           Patient Acceptance, E, VU by  at 7/13/2021 0820    Comment: PT POC, safety with AD                   Point: Precautions (Resolved)     Learning Progress Summary           Patient Acceptance, E, VU by  at 7/13/2021 0820    Comment: PT POC, safety with AD                               User Key     Initials Effective Dates Name Provider Type Discipline     04/22/21 -  Sussy Solorio, PT Student PT Student PT              PT Recommendation and Plan  Planned Therapy Interventions (PT): balance training, bed mobility training, gait training, postural re-education, transfer training, patient/family education, strengthening, home exercise program, ROM (range of motion)  Plan of Care Reviewed With: patient  Progress: no change  Outcome Summary: PT eval completed. Pt AAOx4 sitting EOB upon arrival. 3L O2 NC. No c/o of pain or SOB at rest. [place]os sit<>stand w/ sup and amb 200x2 with CGAx1 and walking stick. Pt tends to reach and grab for room furniture and door frames for stability. Carries walking stick in hand but does not place on the ground for support 50% of the time. Amb with B decreased ankle DF and mild steppage gait. Mild instability during amb but corrects ind. Pt has no c/o increasing SOB during activity. 4/5 B ankle DF with absent sensation to L4-S1 derms B. Pt left sitting EOB for breakfasst. Call light in reach. PT indicated to improve balance with amb, AD safety training  prior to d/c home. D/c rec home with assist.     Time  Calculation:   PT Charges     Row Name 07/13/21 0746             Time Calculation    Start Time  0746  -SB (r) HW (t) SB (c)      Stop Time  0822 add 5 min chart review for a total of 41 min  -SB (r) HW (t) SB (c)      Time Calculation (min)  36 min  -SB (r) HW (t)      PT Received On  07/13/21  -SB (r) HW (t) SB (c)      PT Goal Re-Cert Due Date  07/23/21  -SB (r) HW (t) SB (c)        User Key  (r) = Recorded By, (t) = Taken By, (c) = Cosigned By    Initials Name Provider Type    SB Chelsie Day, PT DPT Physical Therapist    Sussy Almanzar, RAMANDEEP Student PT Student            PT G-Codes  Outcome Measure Options: AM-PAC 6 Clicks Basic Mobility (PT)    Sussy Solorio PT Student  7/13/2021

## 2021-07-13 NOTE — THERAPY DISCHARGE NOTE
Acute Care - Physical Therapy Discharge Summary  [facility]       Patient Name: Isamar Rodriguez  : 1945  MRN: [id number]    Today's Date: 2021                 Admit Date: 7/10/2021      PT Recommendation and Plan    Visit Dx:    ICD-10-CM ICD-9-CM   1. Acute respiratory failure with hypoxia (CMS/[facility], located within [facility])  J96.01 518.81   2. Peripheral vascular disease (CMS/[facility]AC, located within [facility])  I73.9 443.9           PT Charges     Row Name 21 0746             Time Calculation    Start Time  07  (Pended)   -      Stop Time  08  (Pended)  add 5 min chart review for a total of 41 min  -HW      Time Calculation (min)  36 min  (Pended)   -HW      PT Received On  21  (Pended)   -HW      PT Goal Re-Cert Due Date  21  (Pended)   -        User Key  (r) = Recorded By, (t) = Taken By, (c) = Cosigned By    Initials Name Provider Type    HW Sussy Solorio, PT Student PT Student          PT Rehab Goals     Row Name 21 1330 2146          Bed Mobility Goal 1 (PT)    Activity/Assistive Device (Bed Mobility Goal 1, PT)  rolling to left;rolling to right;scooting;sit to supine/supine to sit;sidelying to sit/sit to sidelying  -AB  rolling to left;rolling to right;scooting;sit to supine/supine to sit;sidelying to sit/sit to sidelying  (Pended)   -HW     [place] Level/Cues Needed (Bed Mobility Goal 1, PT)  independent  -AB  independent  (Pended)   -     Time Frame (Bed Mobility Goal 1, PT)  long term goal (LTG)  -AB  long term goal (LTG)  (Pended)   -HW     Progress/Outcomes (Bed Mobility Goal 1, PT)  goal not met  -AB  goal ongoing  (Pended)   -HW        Transfer Goal 1 (PT)    Activity/Assistive Device (Transfer Goal 1, PT)  sit-to-stand/stand-to-sit;bed-to-chair/chair-to-bed  -AB  sit-to-stand/stand-to-sit;bed-to-chair/chair-to-bed  (Pended)   -HW     [place] Level/Cues Needed (Transfer Goal 1, PT)  independent  -AB  independent  (Pended)   -HW     Time Frame (Transfer Goal 1, PT)  long term goal  (LTG)  -AB  long term goal (LTG)  (Pended)   -HW     Progress/Outcome (Transfer Goal 1, PT)  goal not met  -AB  goal ongoing  (Pended)   -HW        Gait Training Goal 1 (PT)    Activity/Assistive Device (Gait Training Goal 1, PT)  gait (walking locomotion);assistive device use;decrease fall risk;diminish gait deviation;increase energy conservation;increase endurance/gait distance;improve balance and speed;normalize weight shifts;forward stepping;sidestepping;turning, left;turning, right;cane, straight  -AB  gait (walking locomotion);assistive device use;decrease fall risk;diminish gait deviation;increase energy conservation;increase endurance/gait distance;improve balance and speed;normalize weight shifts;forward stepping;sidestepping;turning, left;turning, right;cane, straight  (Pended)   -HW     [place] Level (Gait Training Goal 1, PT)  modified independence  -AB  modified independence  (Pended)   -HW     Distance (Gait Training Goal 1, PT)  350+2  -AB  350+2  (Pended)   -HW     Time Frame (Gait Training Goal 1, PT)  long term goal (LTG)  -AB  long term goal (LTG)  (Pended)   -HW     Progress/Outcome (Gait Training Goal 1, PT)  goal not met  -AB  goal ongoing  (Pended)   -HW       User Key  (r) = Recorded By, (t) = Taken By, (c) = Cosigned By    Initials Name Provider Type Discipline    Vane Chaidez PTA Physical Therapy Assistant PT    HW Sussy Solorio, PT Student PT Student PT              PT Discharge Summary  Anticipated Discharge Disposition (PT): home with assist  Reason for Discharge: Discharge from facility  Outcomes Achieved: Refer to plan of care for updates on goals achieved, Discharge from facility occurred on same date as evluation  Discharge Destination: Home      Vane Zarco PTA   7/13/2021

## 2021-07-13 NOTE — DISCHARGE PLACEMENT REQUEST
"Isamar Rodriguez (75 y.o. Male)     Date of Birth Social Security Number Address Home Phone MRN    1945  [address] [place], [place] [address] [phone] [id number]    Mandaeism Marital Status          Congregational        Admission Date Admission Type Admitting Provider Attending Provider Department, Room/Bed    7/10/21 Urgent Cristobal Burch MD Fleming, John Eric, MD [address], 465/1    Discharge Date Discharge Disposition Discharge Destination         Home or Self Care              Attending Provider: Cristobal Burch MD    Allergies: Codeine, Morphine, Propoxyphene    Isolation: None   Infection: None   Code Status: CPR    Ht: 185.4 cm (72.99\")   Wt: 124 kg (274 lb)    Admission Cmt: None   Principal Problem: COPD (chronic obstructive pulmonary disease) (CMS/[facility]) [J44.9]                 Active Insurance as of 7/10/2021     Primary Coverage     Payor Plan Insurance Group Employer/Plan Group    MEDICARE MEDICARE A & B      Payor Plan Address Payor Plan Phone Number Payor Plan Fax Number Effective Dates    [address] [phone]  2/1/2008 - None Entered    [facility] [id number]       Subscriber Name Subscriber Birth Date Member ID       ISAMAR RODRIGUEZ 1945 [id number]           Secondary Coverage     Payor Plan Insurance Group Employer/Plan Group    [facility]     Payor Plan Address Payor Plan Phone Number Payor Plan Fax Number Effective Dates    [address] [address]   1/1/2016 - None Entered    [facility] [phone]       Subscriber Name Subscriber Birth Date Member ID       ISAMAR RODRIGUEZ 1945 [id number]                 Emergency Contacts      (Rel.) Home Phone Work Phone Mobile Phone    JOANNE ROSA (Daughter) -- -- [phone]        Home Oxygen Therapy [EQ60] (Order [id number])  Order  Date: 7/13/2021 Department: [address] Ordering/Authorizing: Anabela Randall APRN   Order History  Outpatient  Date/Time Action Taken " User Additional Information   07/13/21 1052 Sign Anabela Randall APRN    Order Details    Frequency Duration Priority Order Class   None None Routine External   Start Date/Time    Start Date   07/13/21   Order Information    Order Date Service Start Date Start Time   07/13/21 Medicine 07/13/21    Reference Links    Associated Reports   View Encounter   Order Questions    Question Answer Comment   Delivery Modality Nasal Cannula    Liters Per Minute: 3    Duration: Continuous    Equipment  Oxygen Concentrator &  &  Portable Gaseous Oxygen System & Portable Oxygen Contents Gaseous    Length of Need (99 Months = Lifetime) 3 Months    Note:  Custom values to enter length of need for DME          Source Order Set / Preference List    Order Set    IP GEN EXPRESS DISCHARGE [[id number]]   Clinical Indications     ICD-10-CM ICD-9-CM   Acute respiratory failure with hypoxia (CMS/HCC)  - Primary     J96.01 518.81   Reprint Order Requisition    Home Oxygen Therapy (Order #[id number]) on 7/13/21   Encounter    View Encounter          Order Provider Info        Office phone Pager E-mail   Ordering User Anabela Randall APRN [phone] -- --   Authorizing Provider Anabela Randall APRN [phone] -- --   Attending Provider Cristobal Burch [phone] -- --   Linked Charges    No charges linked to this procedure   Tracking Reports  Cosign Tracking Order Transmittal Tracking   Authorized by:  BOSTON Jimenes  (NPI: [id number])       Lab Component SmartPhrase Guide    Home Oxygen Therapy (Order #[id number]) on 7/13/21            History & Physical      Adam Tay MD at 07/10/21 [address] [place] [address] Medicine Services  HISTORY AND PHYSICAL    Date of Admission: 7/10/2021  Primary Care Physician: Juvenal Wynn MD    Subjective     Chief Complaint: Transfer from OSH due to hypoxemia, pericardial effusion    History of Present Illness  Patient is a 75-year-old  " male with past medical history significant for atrial fibrillation on outpatient anticoagulation with Xarelto, chronic obstructive pulmonary disease, in addition to tobacco dependence that presented to our hospital as a transfer from [facility] secondary to hypoxemia, pericardial effusion, and A. fib with RVR.  Patient states that he has not felt well in months.  Over the past couple of days he has had worsening shortness of breath, and per the conversation with the emergency department physician at the outside facility he reportedly had O2 saturations that were in the 80s upon arrival to their ED.  Patient does report that he smokes 2 packs of cigarettes per day.  He does carry diagnosis of COPD.  He reports that his cough recently has been dry.  He denies any chest pain or chest pressure.  He states that he has been very weak, and constantly feels like he is \"dragging.\"  Denies any significant fevers or chills.  Has had some lower extremity edema which he states occasionally can be asymmetric, however he cannot recall which leg typically swells more than the other.  He does report some occasional wheezing.  He reports that he was getting ready to have an appointment with a specialist in [place] regarding a pericardial effusion he was recently diagnosed with.  He denies paroxysmal nocturnal dyspnea and orthopnea.  Patient does report that he had recently been on a trial of steroids as an outpatient.    Review of Systems     Otherwise complete ROS reviewed and negative except as mentioned in the HPI.    Past Medical History:   Past Medical History:   Diagnosis Date   • Anxiety and depression    • Arthritis     hands, knees, ankles   • Bilateral foot pain    • Cataract    • Chronic atrial fibrillation (CMS/HCC)    • Concussion 1979   • COPD (chronic obstructive pulmonary disease) (CMS/HCC)    • Hypertension      Past Surgical History:  Past Surgical History:   Procedure Laterality Date   • " AMPUTATION DIGIT Bilateral 1/15/2020    Procedure: Fourth toe amputation, second and third hammertoe correction;  Surgeon: Darryl Sellers DPM;  Location: [facility];  Service: Podiatry   • CARDIAC CATHETERIZATION     • CERVICAL FUSION  2006   • COLONOSCOPY     • ENDOSCOPY     • EYE SURGERY Bilateral     cataracts removed with implants   • FOREARM SURGERY Right    • HERNIA REPAIR Bilateral    • TONSILLECTOMY       Social History:  reports that he has been smoking. He has a 75.00 pack-year smoking history. He has never used smokeless tobacco. He reports current alcohol use. He reports that he does not use drugs.    Family History: Reports that his mother had bone cancer, and his father had dementia.    Allergies:  Allergies   Allergen Reactions   • Codeine Itching     Itching  Pt denies allergy to codeine. 2/17/16 JIMY Engle   • Morphine Itching     itching   • Propoxyphene Itching       Medications:  Prior to Admission medications    Medication Sig Start Date End Date Taking? Authorizing Provider   furosemide (LASIX) 20 MG tablet Take 20 mg by mouth Daily.   Yes ProviderCharlette MD   rivaroxaban (Xarelto) 20 MG tablet Take 20 mg by mouth. 5/26/20  Yes Emergency, Nurse JIMY Arora   tamsulosin (FLOMAX) 0.4 MG capsule 24 hr capsule Take 0.4 mg by mouth Daily. 5/26/20  Yes Emergency, Nurse JIMY Arora   tiotropium bromide-olodaterol (STIOLTO RESPIMAT) 2.5-2.5 MCG/ACT aerosol solution inhaler Inhale Daily.   Yes Charlette Robles MD   venlafaxine XR (EFFEXOR-XR) 75 MG 24 hr capsule Take 75 mg by mouth Daily.   Yes Provider, MD Charlette   zolpidem (AMBIEN) 10 MG tablet Take 1 tablet by mouth At Night As Needed for Sleep. 10/2/18  Yes Humphries, BOSTON Llamas   atorvastatin (LIPITOR) 40 MG tablet Take 40 mg by mouth. 5/4/20   Emergency, Nurse JIMY Arora   digoxin (LANOXIN) 125 MCG tablet Take 125 mcg by mouth Every Night.    ProviderCharlette MD   enalapril (VASOTEC) 5 MG tablet Take 5 mg by mouth Every Night.  "   Provider, MD Charlette   metoprolol tartrate (LOPRESSOR) 50 MG tablet 50 mg 2 (Two) Times a Day. 5/4/20   Emergency, Nurse Maite, RN   metoprolol succinate XL (TOPROL-XL) 50 MG 24 hr tablet Take 50 mg by mouth Every Night.  7/10/21  Provider, MD Charlette   PROAIR  (90 Base) MCG/ACT inhaler Inhale 2 puffs Every 4 (Four) Hours As Needed for Wheezing or Shortness of Air. 9/18/18 7/10/21  Humphries, BOSTON Llamas     I have utilized all available immediate resources to obtain, update, and review the patient's current medications.    Objective     Vital Signs: /69 (BP Location: Right arm, Patient Position: Lying)   Pulse 90   Temp 98.1 °F (36.7 °C) (Oral)   Resp 20   Ht 185.4 cm (72.99\")   Wt 120 kg (265 lb 3.2 oz)   SpO2 92%   BMI 35.00 kg/m²   Physical Exam  Vitals reviewed.   Constitutional:       Appearance: He is ill-appearing.   HENT:      Head: Normocephalic.      Mouth/Throat:      Pharynx: No oropharyngeal exudate.   Eyes:      Pupils: Pupils are equal, round, and reactive to light.   Cardiovascular:      Rate and Rhythm: Normal rate. Rhythm irregular.      Heart sounds: No friction rub.   Pulmonary:      Effort: Pulmonary effort is normal.      Breath sounds: Wheezing (faint) present. No rhonchi or rales.      Comments: On supp 02 via nasal cannula  Abdominal:      Palpations: Abdomen is soft.   Musculoskeletal:      Cervical back: Neck supple.      Right lower leg: Edema present.      Left lower leg: Edema present.   Skin:     General: Skin is warm.      Capillary Refill: Capillary refill takes less than 2 seconds.   Neurological:      General: No focal deficit present.      Mental Status: He is alert.      Motor: Weakness present.   Psychiatric:         Mood and Affect: Mood normal.          Results Reviewed:  Lab Results (last 24 hours)     ** No results found for the last 24 hours. **        Imaging Results (Last 24 Hours)     ** No results found for the last 24 hours. **        I " have personally reviewed and interpreted the radiology studies and ECG obtained at time of admission.     CT scan of the chest from the outside hospital with the following read: No central filling defects to suggest pulmonary embolus.  Scattered borderline enlarged nodes in the mediastinum.  Significant cardiac enlargement with a moderate-sized pericardial effusion.  At its greatest dimension the effusion measures approximately 1.5 cm.  There are basilar areas of atelectasis with no definite areas of consolidation.  There are no pleural effusions.  This study was completed on June 6    CT scan of the abdomen and pelvis revealed no acute intra-abdominal or intrapelvic abnormalities    CT scan of the head with no acute abnormality    D-dimer 146 (0-600), white blood cell count 12.8, hemoglobin 15.2, platelets 163, Covid test negative, proBNP 2249, TSH 1.35, glucose 180, BUN 16, creatinine 1.37, sodium 138, potassium 4.5, chloride 100, bicarbonate 30, liver function tests were largely unremarkable, INR 1.3    CT scan of the chest performed today reveals the following: No pulmonary embolism.  Large pericardial effusion.  Patchy groundglass infiltrates in both lungs and small left pleural effusion.  Differential includes pulmonary edema or atypical pneumonia.    Assessment / Plan     Assessment:   Active Hospital Problems    Diagnosis    • Pericardial effusion    • Tobacco dependence    • COPD (chronic obstructive pulmonary disease) (CMS/HCC)    • Atrial fibrillation (CMS/HCC)    • Essential hypertension    • Pulmonary infiltrate    • Obesity (BMI 30-39.9)    • Hypoxemia      Plan:   1.  IV Rocephin and p.o. doxy  2.  Solu-Medrol 40 mg IV every 8 hours  3.  Scheduled DuoNeb  4.  Supplemental oxygen  5.  Echocardiogram to evaluate pericardial effusion  6.  Continuous telemetry and pulse oximetry  7.  Resume outpatient rate controlling medications for atrial fibrillation; continue Xarelto  8.  Tobacco cessation  counseling  9.  Nicotine replacement therapy  10.  Respiratory PCR panel  11.  Check digoxin level  12.  P.o. Lasix for now  13.  Incentive spirometry  14.  Check orthostatic blood pressures  15.  CBC, CMP, digoxin level, BNP, procalcitonin in the morning tomorrow.      Code Status/Advanced Care Plan: Full Code    The patient's surrogate decision maker is his daughter, Joellen Rosen      Electronically signed by Adam Tay MD, 07/10/21, 17:18 CDT.              Electronically signed by Adam Tay MD at 07/10/21 1718       Prior to Admission Medications     Prescriptions Last Dose Informant Patient Reported? Taking?    furosemide (LASIX) 20 MG tablet   Yes Yes    Take 20 mg by mouth Daily.    rivaroxaban (Xarelto) 20 MG tablet 7/10/2021  Yes Yes    Take 20 mg by mouth.    tamsulosin (FLOMAX) 0.4 MG capsule 24 hr capsule 7/10/2021  Yes Yes    Take 0.4 mg by mouth Daily.    tiotropium bromide-olodaterol (STIOLTO RESPIMAT) 2.5-2.5 MCG/ACT aerosol solution inhaler   Yes Yes    Inhale Daily.    venlafaxine XR (EFFEXOR-XR) 75 MG 24 hr capsule   Yes Yes    Take 75 mg by mouth Daily.    zolpidem (AMBIEN) 10 MG tablet 7/9/2021  No Yes    Take 1 tablet by mouth At Night As Needed for Sleep.    atorvastatin (LIPITOR) 40 MG tablet   Yes No    Take 40 mg by mouth.    digoxin (LANOXIN) 125 MCG tablet   Yes No    Take 125 mcg by mouth Every Night.    enalapril (VASOTEC) 5 MG tablet   Yes No    Take 5 mg by mouth Every Night.    metoprolol tartrate (LOPRESSOR) 50 MG tablet   Yes No    50 mg 2 (Two) Times a Day.          Current Facility-Administered Medications   Medication Dose Route Frequency Provider Last Rate Last Admin   • acetaminophen (TYLENOL) tablet 650 mg  650 mg Oral Q4H PRN Adam Tay MD   650 mg at 07/12/21 1455   • atorvastatin (LIPITOR) tablet 40 mg  40 mg Oral Nightly Adam Tay MD   40 mg at 07/12/21 2036   • cefTRIAXone (ROCEPHIN) 1 g/100 mL 0.9% NS (MBP)  1 g Intravenous  Q24H Adam Tay MD   1 g at 07/12/21 1752   • cyclobenzaprine (FLEXERIL) tablet 5 mg  5 mg Oral TID PRN Cristobal Burch MD   5 mg at 07/12/21 2036   • dextrose (D50W) 25 g/ 50mL Intravenous Solution 25 g  25 g Intravenous Q15 Min PRN Anabela Randall APRN       • dextrose (GLUTOSE) oral gel 15 g  15 g Oral Q15 Min PRN Anabela Randall APRN       • digoxin (LANOXIN) tablet 125 mcg  125 mcg Oral Daily Adam Tay MD   125 mcg at 07/12/21 1223   • dilTIAZem (CARDIZEM) 125 mg in 125 mL NS infusion  5-15 mg/hr Intravenous Titrated Adam Tay MD       • doxycycline (ADOXA) tablet 100 mg  100 mg Oral Q12H Adam Tay MD   100 mg at 07/13/21 0832   • enalapril (VASOTEC) tablet 5 mg  5 mg Oral Nightly Adam Tay MD   5 mg at 07/12/21 2045   • famotidine (PEPCID) tablet 20 mg  20 mg Oral BID AC Adam Tay MD   20 mg at 07/13/21 0832   • glucagon (human recombinant) (GLUCAGEN DIAGNOSTIC) injection 1 mg  1 mg Subcutaneous Q15 Min PRN Anabela Randall APRN       • guaiFENesin (MUCINEX) 12 hr tablet 1,200 mg  1,200 mg Oral Q12H Adam Tay MD   1,200 mg at 07/13/21 0832   • insulin lispro (humaLOG) injection 0-9 Units  0-9 Units Subcutaneous TID AC Anabela Randall APRN   4 Units at 07/13/21 0832   • ipratropium (ATROVENT) nebulizer solution 0.5 mg  0.5 mg Nebulization 4x Daily - RT Cristobal Burch MD   0.5 mg at 07/13/21 0930   • LORazepam (ATIVAN) tablet 0.5 mg  0.5 mg Oral Q2H PRN Cristobal Burch MD        Or   • LORazepam (ATIVAN) injection 0.5 mg  0.5 mg Intravenous Q2H PRN Cristobal Burch MD   0.5 mg at 07/11/21 1826    Or   • LORazepam (ATIVAN) tablet 1 mg  1 mg Oral Q1H PRN Cristobal Burch MD        Or   • LORazepam (ATIVAN) injection 1 mg  1 mg Intravenous Q1H PRN Cristobal Burch MD   1 mg at 07/11/21 2137    Or   • LORazepam (ATIVAN) injection 1 mg  1 mg Intravenous Q15 Min PRN Cristobal Burch MD        Or   • LORazepam (ATIVAN)  injection 1 mg  1 mg Intramuscular Q15 Min PRN Cristobal Burch MD       • methylPREDNISolone sodium succinate (SOLU-Medrol) injection 40 mg  40 mg Intravenous Q12H Anabela Randall APRN   40 mg at 07/13/21 0832   • metoprolol tartrate (LOPRESSOR) tablet 50 mg  50 mg Oral Q12H Adam Tay MD   50 mg at 07/13/21 0832   • nicotine (NICODERM CQ) 21 MG/24HR patch 1 patch  1 patch Transdermal Q24H Adam Tay MD   1 patch at 07/13/21 0832   • ondansetron (ZOFRAN) injection 4 mg  4 mg Intravenous Q6H PRN Adam Tay MD       • rivaroxaban (XARELTO) tablet 20 mg  20 mg Oral Daily With Dinner Adam Tay MD   20 mg at 07/12/21 1752   • sodium chloride 0.9 % flush 10 mL  10 mL Intravenous Q12H Adam Tay MD   10 mL at 07/12/21 2036   • sodium chloride 0.9 % flush 10 mL  10 mL Intravenous PRN Adam Tay MD       • tamsulosin (FLOMAX) 24 hr capsule 0.4 mg  0.4 mg Oral Daily Adam Tay MD   0.4 mg at 07/13/21 0832   • venlafaxine XR (EFFEXOR-XR) 24 hr capsule 75 mg  75 mg Oral Daily Adam Tay MD   75 mg at 07/13/21 0832     Traci Law, KYLER   Respiratory Tech   Respiratory Therapy   Progress Notes       Signed   Date of Service:  07/13/21 0929   Creation Time:  07/13/21 0929            Signed             Show:Clear all  [x]Manual[x]Template[]Copied    Added by:  [x]Traci Law, KYLER    []Fiorella for details  Exercise Oximetry     Patient Name:Isamar Rodriguez   MRN: [id number]   Date: 07/13/21                                                                                                     ROOM AIR BASELINE   SpO2%       86   Heart Rate     82   Blood Pressure       EXERCISE ON ROOM AIR SpO2% EXERCISE ON O2 @ 3 LPM SpO2%   1 MINUTE   1 MINUTE       92   2 MINUTES   2 MINUTES       91   3 MINUTES   3 MINUTES       91   4 MINUTES   4 MINUTES     5 MINUTES   5 MINUTES     6 MINUTES   6 MINUTES                                                                                                                    Distance Walked   Distance Walked   Dyspnea (Camila Scale)   Dyspnea (Camila Scale)   Fatigue (Camila Scale)   Fatigue (Camila Scale)   SpO2% Post Exercise   SpO2% Post Exercise      94   HR Post Exercise   HR Post Exercise            102   Time to Recovery   Time to Recovery      Less than 1 minute      Comments:   Pt on room air when I came in the room and sat 86%.  Placed pt on 2 lpm nc and sat increased to 90%.  When he started to walk his sat immediately dropped to 88%.  Increased oxygen to 3 lpm and waited until sat increased to 94%.  Walked around room for 200 feet and sat dropped to 91% at the lowest.

## 2021-07-13 NOTE — DISCHARGE SUMMARY
[facility] Medicine Services  DISCHARGE SUMMARY       Date of Admission: 7/10/2021  Date of Discharge:  7/13/2021  Primary Care Physician: Juvenal Wynn MD    Presenting Problem/History of Present Illness:  Pericardial effusion [I31.3]     Final Discharge Diagnoses:  Active Hospital Problems    Diagnosis    • **COPD (chronic obstructive pulmonary disease) (CMS/HCC)    • Acute respiratory failure with hypoxia (CMS/HCC)    • Type 2 diabetes mellitus with hyperglycemia (CMS/HCC)    • Pericardial effusion    • Tobacco dependence    • Atrial fibrillation (CMS/HCC) with rapid ventricular response    • Essential hypertension    • Pulmonary infiltrate    • Obesity (BMI 30-39.9)    • Hypoxemia        Consults: Dr. Hernandez with cardiology.    Procedures Performed: None.    Pertinent Test Results:   Results for orders placed during the hospital encounter of 07/10/21    Adult Transthoracic Echo Complete W/ Cont if Necessary Per Protocol    Interpretation Summary  · Left ventricular systolic function is normal. Left ventricular ejection fraction appears to be 61 - 65%.  · Normal right ventricular cavity size and systolic function noted.  · Cardiac valves are poorly visualized, however no obvious abnormalities are noted by Doppler assessment.  · There is a moderate (1-2cm) pericardial effusion without echocardiographic evidence for tamponade.    Imaging Results (Last 72 Hours)     Procedure Component Value Units Date/Time    XR Chest 1 View [[id number]] Collected: 07/11/21 1433     Updated: 07/11/21 1437    Narrative:      EXAMINATION: XR CHEST 1 VW-     7/11/2021 2:20 PM CDT     HISTORY: pericardial effusion     1 view chest x-ray.  No comparison.     Enlarged heart with rounded configuration compatible with the provided  history of pericardial effusion.     Interstitial lung disease appears to be chronic though there is  nonvisualization of the left diaphragm behind the heart compatible  with  infiltrate or atelectasis within the left lower lobe.     There is no pneumothorax or heart failure.     Summary:  1. Cardiac enlargement.  2. Retrocardiac left lower lobe infiltrate or atelectasis.  This report was finalized on 07/11/2021 14:34 by Dr. Rashaun Liu MD.        Lab Results (last 72 hours)     Procedure Component Value Units Date/Time    POC Glucose Once [[id number]]  (Abnormal) Collected: 07/13/21 0736    Specimen: Blood Updated: 07/13/21 0747     Glucose 220 mg/dL      Comment: : [id number] Spencer GeeeMeter ID: [id number]       POC Glucose Once [[id number]]  (Abnormal) Collected: 07/13/21 0209    Specimen: Blood Updated: 07/13/21 0220     Glucose 206 mg/dL      Comment: : [id number] Karri BaumanneeMeter ID: [id number]       POC Glucose Once [[id number]]  (Abnormal) Collected: 07/12/21 1922    Specimen: Blood Updated: 07/12/21 1934     Glucose 285 mg/dL      Comment: : [id number] Hemant GeraldoahMeter ID: [id number]       POC Glucose Once [[id number]]  (Abnormal) Collected: 07/12/21 1650    Specimen: Blood Updated: 07/12/21 1721     Glucose 272 mg/dL      Comment: : [id number] Baljinder LinvarshaMeter ID: [id number]       Procalcitonin [[id number]]  (Normal) Collected: 07/12/21 0426    Specimen: Blood Updated: 07/12/21 0549     Procalcitonin 0.05 ng/mL     Narrative:      As a Marker for Sepsis (Non-Neonates):     1. <0.5 ng/mL represents a low risk of severe sepsis and/or septic shock.  2. >2 ng/mL represents a high risk of severe sepsis and/or septic shock.    As a Marker for Lower Respiratory Tract Infections that require antibiotic therapy:  PCT on Admission     Antibiotic Therapy             6-12 Hrs later  >0.5                          Strongly Recommended            >0.25 - <0.5             Recommended  0.1 - 0.25                  Discouraged                       Remeasure/reassess PCT  <0.1                         Strongly Discouraged         Remeasure/reassess PCT      As 28 day  "mortality risk marker: \"Change in Procalcitonin Result\" (>80% or <=80%) if Day 0 (or Day 1) and Day 4 values are available. Refer to [email or web address]    Change in PCT <=80 %   A decrease of PCT levels below or equal to 80% defines a positive change in PCT test result representing a higher risk for 28-day all-cause mortality of patients diagnosed with severe sepsis or septic shock.    Change in PCT >80 %   A decrease of PCT levels of more than 80% defines a negative change in PCT result representing a lower risk for 28-day all-cause mortality of patients diagnosed with severe sepsis or septic shock.                Basic Metabolic Panel [[id number]]  (Abnormal) Collected: 07/12/21 0426    Specimen: Blood Updated: 07/12/21 0548     Glucose 224 mg/dL      BUN 26 mg/dL      Creatinine 1.23 mg/dL      Sodium 136 mmol/L      Potassium 4.8 mmol/L      Chloride 99 mmol/L      CO2 27.0 mmol/L      Calcium 8.4 mg/dL      eGFR Non African Amer 57 mL/min/1.73      BUN/Creatinine Ratio 21.1     Anion Gap 10.0 mmol/L     Narrative:      GFR Normal >60  Chronic Kidney Disease <60  Kidney Failure <15      Magnesium [[id number]]  (Normal) Collected: 07/12/21 0426    Specimen: Blood Updated: 07/12/21 0542     Magnesium 2.2 mg/dL     Digoxin Level [[id number]]  (Normal) Collected: 07/12/21 0426    Specimen: Blood Updated: 07/12/21 0542     Digoxin 0.70 ng/mL     CBC (No Diff) [[id number]]  (Abnormal) Collected: 07/12/21 0426    Specimen: Blood Updated: 07/12/21 0527     WBC 15.45 10*3/mm3      RBC 3.19 10*6/mm3      Hemoglobin 10.2 g/dL      Hematocrit 32.5 %      .9 fL      MCH 32.0 pg      MCHC 31.4 g/dL      RDW 15.5 %      RDW-SD 57.1 fl      MPV 11.1 fL      Platelets 187 10*3/mm3     Hemoglobin A1c [[id number]]  (Abnormal) Collected: 07/11/21 0329    Specimen: Blood Updated: 07/11/21 1334     Hemoglobin A1C 7.80 %     Narrative:      Hemoglobin A1C Ranges:    Increased Risk for Diabetes  5.7% to " "6.4%  Diabetes                     >= 6.5%  Diabetic Goal                < 7.0%    Digoxin Level [[id number]]  (Abnormal) Collected: 07/11/21 0328    Specimen: Blood Updated: 07/11/21 0423     Digoxin 1.50 ng/mL     Procalcitonin [[id number]]  (Normal) Collected: 07/11/21 0328    Specimen: Blood Updated: 07/11/21 0423     Procalcitonin 0.06 ng/mL     Narrative:      As a Marker for Sepsis (Non-Neonates):     1. <0.5 ng/mL represents a low risk of severe sepsis and/or septic shock.  2. >2 ng/mL represents a high risk of severe sepsis and/or septic shock.    As a Marker for Lower Respiratory Tract Infections that require antibiotic therapy:  PCT on Admission     Antibiotic Therapy             6-12 Hrs later  >0.5                          Strongly Recommended            >0.25 - <0.5             Recommended  0.1 - 0.25                  Discouraged                       Remeasure/reassess PCT  <0.1                         Strongly Discouraged         Remeasure/reassess PCT      As 28 day mortality risk marker: \"Change in Procalcitonin Result\" (>80% or <=80%) if Day 0 (or Day 1) and Day 4 values are available. Refer to http://[email or web address]ct-[email or web address]/    Change in PCT <=80 %   A decrease of PCT levels below or equal to 80% defines a positive change in PCT test result representing a higher risk for 28-day all-cause mortality of patients diagnosed with severe sepsis or septic shock.    Change in PCT >80 %   A decrease of PCT levels of more than 80% defines a negative change in PCT result representing a lower risk for 28-day all-cause mortality of patients diagnosed with severe sepsis or septic shock.                Comprehensive Metabolic Panel [[id number]]  (Abnormal) Collected: 07/11/21 0328    Specimen: Blood Updated: 07/11/21 0417     Glucose 395 mg/dL      BUN 25 mg/dL      Creatinine 1.39 mg/dL      Sodium 136 mmol/L      Potassium 5.1 mmol/L      Chloride 100 mmol/L      CO2 26.0 mmol/L      Calcium 8.4 mg/dL      " Total Protein 6.4 g/dL      Albumin 3.60 g/dL      ALT (SGPT) 8 U/L      AST (SGOT) 11 U/L      Alkaline Phosphatase 55 U/L      Total Bilirubin 0.7 mg/dL      eGFR Non African Amer 50 mL/min/1.73      Globulin 2.8 gm/dL      A/G Ratio 1.3 g/dL      BUN/Creatinine Ratio 18.0     Anion Gap 10.0 mmol/L     Narrative:      GFR Normal >60  Chronic Kidney Disease <60  Kidney Failure <15      CBC & Differential [[id number]]  (Abnormal) Collected: 07/11/21 0329    Specimen: Blood Updated: 07/11/21 0356    Narrative:      The following orders were created for panel order CBC & Differential.  Procedure                               Abnormality         Status                     ---------                               -----------         ------                     CBC Auto Differential[[id number]]        Abnormal            Final result                 Please view results for these tests on the individual orders.    CBC Auto Differential [[id number]]  (Abnormal) Collected: 07/11/21 0329    Specimen: Blood Updated: 07/11/21 0356     WBC 12.54 10*3/mm3      RBC 3.45 10*6/mm3      Hemoglobin 11.1 g/dL      Hematocrit 35.2 %      .0 fL      MCH 32.2 pg      MCHC 31.5 g/dL      RDW 15.7 %      RDW-SD 57.7 fl      MPV 10.7 fL      Platelets 201 10*3/mm3      Neutrophil % 95.5 %      Lymphocyte % 2.1 %      Monocyte % 1.7 %      Eosinophil % 0.0 %      Basophil % 0.1 %      Immature Grans % 0.6 %      Neutrophils, Absolute 11.99 10*3/mm3      Lymphocytes, Absolute 0.26 10*3/mm3      Monocytes, Absolute 0.21 10*3/mm3      Eosinophils, Absolute 0.00 10*3/mm3      Basophils, Absolute 0.01 10*3/mm3      Immature Grans, Absolute 0.07 10*3/mm3      nRBC 0.0 /100 WBC     Basic Metabolic Panel [[id number]]  (Abnormal) Collected: 07/10/21 2214    Specimen: Blood Updated: 07/10/21 2238     Glucose 311 mg/dL      BUN 22 mg/dL      Creatinine 1.27 mg/dL      Sodium 136 mmol/L      Potassium 5.1 mmol/L      Chloride 100 mmol/L      CO2 25.0  mmol/L      Calcium 8.8 mg/dL      eGFR Non African Amer 55 mL/min/1.73      BUN/Creatinine Ratio 17.3     Anion Gap 11.0 mmol/L     Narrative:      GFR Normal >60  Chronic Kidney Disease <60  Kidney Failure <15      BNP [[id number]]  (Abnormal) Collected: 07/10/21 2214    Specimen: Blood Updated: 07/10/21 2237     proBNP 2,483.0 pg/mL     Narrative:      Among patients with dyspnea, NT-proBNP is highly sensitive for the detection of acute congestive heart failure. In addition NT-proBNP of <300 pg/ml effectively rules out acute congestive heart failure with 99% negative predictive value.    Results may be falsely decreased if patient taking Biotin.      Magnesium [[id number]]  (Normal) Collected: 07/10/21 2214    Specimen: Blood Updated: 07/10/21 2232     Magnesium 2.3 mg/dL     CBC & Differential [[id number]]  (Abnormal) Collected: 07/10/21 2214    Specimen: Blood Updated: 07/10/21 2220    Narrative:      The following orders were created for panel order CBC & Differential.  Procedure                               Abnormality         Status                     ---------                               -----------         ------                     CBC Auto Differential[[id number]]        Abnormal            Final result                 Please view results for these tests on the individual orders.    CBC Auto Differential [[id number]]  (Abnormal) Collected: 07/10/21 2214    Specimen: Blood Updated: 07/10/21 2220     WBC 10.23 10*3/mm3      RBC 3.65 10*6/mm3      Hemoglobin 11.6 g/dL      Hematocrit 37.3 %      .2 fL      MCH 31.8 pg      MCHC 31.1 g/dL      RDW 15.7 %      RDW-SD 57.7 fl      MPV 10.5 fL      Platelets 227 10*3/mm3      Neutrophil % 95.3 %      Lymphocyte % 2.8 %      Monocyte % 1.0 %      Eosinophil % 0.0 %      Basophil % 0.2 %      Immature Grans % 0.7 %      Neutrophils, Absolute 9.75 10*3/mm3      Lymphocytes, Absolute 0.29 10*3/mm3      Monocytes, Absolute 0.10 10*3/mm3      Eosinophils,  Absolute 0.00 10*3/mm3      Basophils, Absolute 0.02 10*3/mm3      Immature Grans, Absolute 0.07 10*3/mm3      nRBC 0.0 /100 WBC     Respiratory Panel, PCR (WITHOUT COVID) - Swab, Nasopharynx [[id number]]  (Normal) Collected: 07/10/21 1806    Specimen: Swab from Nasopharynx Updated: 07/10/21 1931     ADENOVIRUS, PCR Not Detected     Coronavirus 229E Not Detected     Coronavirus HKU1 Not Detected     Coronavirus NL63 Not Detected     Coronavirus OC43 Not Detected     Human Metapneumovirus Not Detected     Human Rhinovirus/Enterovirus Not Detected     Influenza B PCR Not Detected     Parainfluenza Virus 1 Not Detected     Parainfluenza Virus 2 Not Detected     Parainfluenza Virus 3 Not Detected     Parainfluenza Virus 4 Not Detected     Bordetella pertussis pcr Not Detected     Chlamydophila pneumoniae PCR Not Detected     Mycoplasma pneumo by PCR Not Detected     Influenza A PCR Not Detected     RSV, PCR Not Detected     Bordetella parapertussis PCR Not Detected    Narrative:      The coronavirus on the RVP is NOT COVID-19 and is NOT indicative of infection with COVID-19.    In the setting of a positive respiratory panel with a viral infection PLUS a negative procalcitonin without other underlying concern for bacterial infection, consider observing off antibiotics or discontinuation of antibiotics and continue supportive care. If the respiratory panel is positive for atypical bacterial infection (Bordetella pertussis, Chlamydophila pneumoniae, or Mycoplasma pneumoniae), consider antibiotic de-escalation to target atypical bacterial infection.        Chief Complaint on Day of Discharge: Overall, patient reports feeling well.  States breathing has much improved.  He feels the best he has felt in over 2 weeks.  Encourage use of incentive spirometry.  He is tolerating a diet.  Denies nausea, vomiting or abdominal pain. He is hemodynamically stable.  He is eager for discharge home.     Hospital Course:  The patient is a 75  y.o. male who presented to [facility] as a transfer from [facility] on 7/10 for hypoxemia, pericardial effusion, and atrial fibrillation with rapid ventricular response.  He has a past medical history significant for atrial fibrillation, chronically anticoagulated on Xarelto, essential hypertension, hyperlipidemia and peripheral vascular disease.  He follows with Dr. Wynn for his primary care.  He initially presented for weakness and shortness of breath.  Denies having any chest pain or chest discomfort.  Denies palpitations, lightheadedness, dizziness, syncope.  Denies recent fever or chills.  He does have some chronic lower extremity edema. Upon evaluation at [facility] emergency department, he was noted to be hypoxic in the 80s.  He had improvement in SPO2 with 3 L nasal cannula.  He was started on IV Solu-Medrol.  Of note, he was recently diagnosed with pericardial effusion and an upcoming appointment with a specialist in [place].  He was given IV digoxin injection 500 mcg for atrial fibrillation with rapid ventricular response in addition to 500 cc bolus.  Digoxin level was supratherapeutic and home dose was held on 7/11.  He was admitted to the hospitalist service for further evaluation and management.    Respiratory PCR panel negative.  He was continued on IV Solu-Medrol with taper at time of discharge.  He was continued on Atrovent nebulizer treatment 4 times daily in addition to mucolytic.  He does have a nebulizer machine and breathing treatments that he can take on an as-needed basis as outpatient.  He has received 3 days of IV ceftriaxone and IV doxycycline, will go ahead and transition to oral to complete a total of 7 days antibiotic therapy.  Supplemental oxygen has been weaned to room air.  Walking oximetry today qualifies patient for 3 L oxygen continuously and with exertion.  He was seen in consultation by cardiology for atrial fibrillation with rapid ventricular  "response.  Dose of digoxin resumed on 7/12 in addition to Lopressor 50 mg twice daily.  He has remained rate controlled atrial flutter.  Transthoracic echocardiogram showed preserved ejection fraction.  Moderate 1-2 cm pericardial effusion without echocardiographic evidence for tamponade.  He denies chest pain or pressure.  He was continued on coagulation with Xarelto which is a new medication.  Further cardiac work-up recommended during hospitalization.  He is to follow-up with Dr. Wynn at time of discharge.  Hemoglobin A1c 7.8.  Previous hemoglobin A1c in September 2018 was 6.0.  Reports in the past he was on medicine for type 2 diabetes and had a glucometer at one time.  However, states that was \"years ago.\"  Diabetes educator consulted.  He was monitored closely on moderate sliding scale insulin.  Recommend to start patient on metformin 500 twice daily with increase to 1000 mg twice daily as outpatient. DVT prophylaxis achieved by virtue being chronically anticoagulated on Xarelto for history of atrial fibrillation.  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  Recommend to paint wounds with Betadine twice daily.  Elevate bilateral lower extremities.  Elevate heels off the bed.  Recommend outpatient vascular surgery consultation.  Strongly encouraged tobacco cessation.  Offer nicotine patch.  [facility] protocol with as needed medication.  States he would previously drink bourbon but has not had a drink in over a month.  Overall, patient reports feeling well.  States breathing has much improved.  He feels the best he has felt in over 2 weeks.  Encourage use of incentive spirometry.  He is tolerating a diet.  Denies nausea, vomiting or abdominal pain. He has been up ambulating without difficulty.  He is hemodynamically stable.  He is appropriate for discharge home.  He is to follow up with Dr. Wynn in one week.    Condition on Discharge:  Medically " "stable.    Physical Exam on Discharge:  /83 (BP Location: Left arm, Patient Position: Sitting)   Pulse 86   Temp 97.8 °F (36.6 °C) (Oral)   Resp 16   Ht 185.4 cm (72.99\")   Wt 124 kg (274 lb)   SpO2 95%   BMI 36.16 kg/m²   Physical Exam   Vitals reviewed.   Constitutional:       General: He is not in acute distress.     Appearance: He is not toxic-appearing.      Interventions: Nasal cannula in place.      Comments: Sitting on the edge of the bed.  No acute distress. On 3 L nasal cannula.   HENT:      Head: Normocephalic and atraumatic.      Mouth/Throat:      Mouth: Mucous membranes are moist.      Pharynx: Oropharynx is clear.   Eyes:      Extraocular Movements: Extraocular movements intact.      Conjunctiva/sclera: Conjunctivae normal.      Pupils: Pupils are equal, round, and reactive to light.   Cardiovascular:      Rate and Rhythm: Normal rate and regular rhythm.      Pulses: Normal pulses.      Comments: Atrial flutter rate controlled  Pulmonary:      Effort: Pulmonary effort is normal. No respiratory distress.      Breath sounds: Normal breath sounds. No wheezing.   Abdominal:      General: Bowel sounds are normal. There is no distension.      Palpations: Abdomen is soft.      Tenderness: There is no abdominal tenderness.   Musculoskeletal:         General: No swelling or tenderness. Normal range of motion.      Cervical back: Normal range of motion and neck supple. No muscular tenderness.   Skin:     General: Skin is warm and dry.      Findings: No erythema or rash.     Comments: Arterial ulcerations of toes of right 2nd and 3rd toes and right great toe  Neurological:      General: No focal deficit present.      Mental Status: He is alert and oriented to person, place, and time.      Cranial Nerves: No cranial nerve deficit.      Motor: No weakness.   Psychiatric:         Mood and Affect: Mood normal.         Behavior: Behavior normal.     Discharge Disposition:  Home or Self Care    Discharge " Medications:     Discharge Medications      New Medications      Instructions Start Date   cefdinir 300 MG capsule  Commonly known as: OMNICEF   300 mg, Oral, 2 Times Daily      doxycycline 100 MG capsule  Commonly known as: VIBRAMYCIN   100 mg, Oral, 2 Times Daily      guaiFENesin 600 MG 12 hr tablet  Commonly known as: MUCINEX   1,200 mg, Oral, Every 12 Hours Scheduled      metFORMIN 500 MG tablet  Commonly known as: Glucophage   500 mg, Oral, 2 Times Daily With Meals      nicotine 21 MG/24HR patch  Commonly known as: NICODERM CQ   1 patch, Transdermal, Every 24 Hours Scheduled   Start Date: July 14, 2021     predniSONE 10 MG tablet  Commonly known as: DELTASONE   Take 4 tablets by mouth Daily for 3 days, THEN 3 tablets Daily for 3 days, THEN 2 tablets Daily for 3 days, THEN 1 tablet Daily for 3 days.   Start Date: July 13, 2021        Continue These Medications      Instructions Start Date   atorvastatin 40 MG tablet  Commonly known as: LIPITOR   40 mg, Oral      digoxin 125 MCG tablet  Commonly known as: LANOXIN   125 mcg, Oral, Nightly      enalapril 5 MG tablet  Commonly known as: VASOTEC   5 mg, Oral, Nightly      furosemide 20 MG tablet  Commonly known as: LASIX   20 mg, Oral, Daily      metoprolol tartrate 50 MG tablet  Commonly known as: LOPRESSOR   50 mg, 2 Times Daily      tamsulosin 0.4 MG capsule 24 hr capsule  Commonly known as: FLOMAX   0.4 mg, Oral, Daily      tiotropium bromide-olodaterol 2.5-2.5 MCG/ACT aerosol solution inhaler  Commonly known as: STIOLTO RESPIMAT   Inhalation, Daily - RT      venlafaxine XR 75 MG 24 hr capsule  Commonly known as: EFFEXOR-XR   75 mg, Oral, Daily      Xarelto 20 MG tablet  Generic drug: rivaroxaban   20 mg, Oral      zolpidem 10 MG tablet  Commonly known as: AMBIEN   10 mg, Oral, Nightly PRN             Discharge Diet:   Diet Instructions     Diet: Regular, Cardiac, Consistent Carbohydrate      Discharge Diet:  Regular  Cardiac  Consistent Carbohydrate              Activity at Discharge:   Activity Instructions     Activity as Tolerated            Discharge Care Plan/Instructions:   1.  Follow up with Dr. Wynn in one week.  2.  Continue prednisone until completion.  3.  Continue doxycyline and ceftriaxone until completion.  4.  Metformin 5 mg twice daily.  5.  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  Recommend to paint wounds with Betadine twice daily.  Elevate bilateral lower extremities.  Elevate heels off the bed.  Recommend outpatient vascular surgery consultation.    6.  Seek evaluation for worsening symptoms.    Test Results Pending at Discharge: None.    Electronically signed by BOSTON Jimenes, 07/13/21, 10:53 CDT.    Time: 35 minutes.

## 2021-07-14 ENCOUNTER — READMISSION MANAGEMENT (OUTPATIENT)
Dept: CALL CENTER | Facility: HOSPITAL | Age: 76
End: 2021-07-14

## 2021-07-14 NOTE — PAYOR COMM NOTE
"DE HOME 7-13-21  [id number]    Isamar Rodriguez (75 y.o. Male)     Date of Birth Social Security Number Address Home Phone MRN    1945  [address] [employer], [place] [address] [phone] [id number]    Muslim Marital Status          Mosque        Admission Date Admission Type Admitting Provider Attending Provider Department, Room/Bed    7/10/21 Urgent Cristobal Burch MD  [facility] 4B, 465/1    Discharge Date Discharge Disposition Discharge Destination        7/13/2021 Home or Self Care              Attending Provider: (none)   Allergies: Codeine, Morphine, Propoxyphene    Isolation: None   Infection: None   Code Status: Prior    Ht: 185.4 cm (72.99\")   Wt: 124 kg (274 lb)    Admission Cmt: None   Principal Problem: COPD (chronic obstructive pulmonary disease) (CMS/[facility]) [J44.9]                 Active Insurance as of 7/10/2021     Primary Coverage     Payor Plan Insurance Group Employer/Plan Group    MEDICARE MEDICARE A & B      Payor Plan Address Payor Plan Phone Number Payor Plan Fax Number Effective Dates    [address] [phone]  2/1/2008 - None Entered    [facility] [id number]       Subscriber Name Subscriber Birth Date Member ID       ISAMAR RODRIGUEZ 1945 [id number]           Secondary Coverage     Payor Plan Insurance Group Employer/Plan Group    [facility]     Payor Plan Address Payor Plan Phone Number Payor Plan Fax Number Effective Dates    [address] [address]   1/1/2016 - None Entered    [facility] [id number]       Subscriber Name Subscriber Birth Date Member ID       ISAMAR RODRIGUEZ 1945 [id number]                 Emergency Contacts      (Rel.) Home Phone Work Phone Mobile Phone    JOANNE ROSA (Daughter) -- -- [phone]               Discharge Summary      Anabela Randall APRN at 07/13/21 0900     Attestation signed by Cristobal Burch MD at 07/13/21 1931    I personally evaluated and examined the patient in conjunction " with BOSTON Boyd and agree with the assessment, treatment plan, and disposition of the patient as recorded by her. My history, exam, and further recommendations are:     Patient appropriate for discharge feeling much better.  The patient on 3 L NC and will likely need this at baseline.  Patient HR better controlled.  Xarelto for anticoagulation.  Doxycyline and omnicef for a total of 7 day course.  This will cover any respiratory issues as well as skin issues on his feet from arterial ulcers.    Electronically signed by Cristobal Burch MD, 7/13/2021, 19:30 CDT.                      [facility] Medicine Services  DISCHARGE SUMMARY       Date of Admission: 7/10/2021  Date of Discharge:  7/13/2021  Primary Care Physician: Juvenal Wynn MD    Presenting Problem/History of Present Illness:  Pericardial effusion [I31.3]     Final Discharge Diagnoses:  Active Hospital Problems    Diagnosis    • **COPD (chronic obstructive pulmonary disease) (CMS/HCC)    • Acute respiratory failure with hypoxia (CMS/HCC)    • Type 2 diabetes mellitus with hyperglycemia (CMS/HCC)    • Pericardial effusion    • Tobacco dependence    • Atrial fibrillation (CMS/HCC) with rapid ventricular response    • Essential hypertension    • Pulmonary infiltrate    • Obesity (BMI 30-39.9)    • Hypoxemia        Consults: Dr. Hernandez with cardiology.    Procedures Performed: None.    Pertinent Test Results:   Results for orders placed during the hospital encounter of 07/10/21    Adult Transthoracic Echo Complete W/ Cont if Necessary Per Protocol    Interpretation Summary  · Left ventricular systolic function is normal. Left ventricular ejection fraction appears to be 61 - 65%.  · Normal right ventricular cavity size and systolic function noted.  · Cardiac valves are poorly visualized, however no obvious abnormalities are noted by Doppler assessment.  · There is a moderate (1-2cm) pericardial effusion without  echocardiographic evidence for tamponade.    Imaging Results (Last 72 Hours)     Procedure Component Value Units Date/Time    XR Chest 1 View [[id number]] Collected: 07/11/21 1433     Updated: 07/11/21 1437    Narrative:      EXAMINATION: XR CHEST 1 VW-     7/11/2021 2:20 PM CDT     HISTORY: pericardial effusion     1 view chest x-ray.  No comparison.     Enlarged heart with rounded configuration compatible with the provided  history of pericardial effusion.     Interstitial lung disease appears to be chronic though there is  nonvisualization of the left diaphragm behind the heart compatible with  infiltrate or atelectasis within the left lower lobe.     There is no pneumothorax or heart failure.     Summary:  1. Cardiac enlargement.  2. Retrocardiac left lower lobe infiltrate or atelectasis.  This report was finalized on 07/11/2021 14:34 by Dr. Rashaun Liu MD.        Lab Results (last 72 hours)     Procedure Component Value Units Date/Time    POC Glucose Once [[id number]]  (Abnormal) Collected: 07/13/21 0736    Specimen: Blood Updated: 07/13/21 0747     Glucose 220 mg/dL      Comment: : [id number] Spencer Pink ID: [id number]       POC Glucose Once [[id number]]  (Abnormal) Collected: 07/13/21 0209    Specimen: Blood Updated: 07/13/21 0220     Glucose 206 mg/dL      Comment: : [id number] Karri BaumanneeMeter ID: [id number]       POC Glucose Once [[id number]]  (Abnormal) Collected: 07/12/21 1922    Specimen: Blood Updated: 07/12/21 1934     Glucose 285 mg/dL      Comment: : [id number] Hemant ChowahMeter ID: [id number]       POC Glucose Once [[id number]]  (Abnormal) Collected: 07/12/21 1650    Specimen: Blood Updated: 07/12/21 1721     Glucose 272 mg/dL      Comment: : [id number] Baljinder LaughlinMeter ID: [id number]       Procalcitonin [[id number]]  (Normal) Collected: 07/12/21 0426    Specimen: Blood Updated: 07/12/21 0549     Procalcitonin 0.05 ng/mL     Narrative:      As a Marker for Sepsis  "(Non-Neonates):     1. <0.5 ng/mL represents a low risk of severe sepsis and/or septic shock.  2. >2 ng/mL represents a high risk of severe sepsis and/or septic shock.    As a Marker for Lower Respiratory Tract Infections that require antibiotic therapy:  PCT on Admission     Antibiotic Therapy             6-12 Hrs later  >0.5                          Strongly Recommended            >0.25 - <0.5             Recommended  0.1 - 0.25                  Discouraged                       Remeasure/reassess PCT  <0.1                         Strongly Discouraged         Remeasure/reassess PCT      As 28 day mortality risk marker: \"Change in Procalcitonin Result\" (>80% or <=80%) if Day 0 (or Day 1) and Day 4 values are available. Refer to http://www.Remindpct-calculator.com/    Change in PCT <=80 %   A decrease of PCT levels below or equal to 80% defines a positive change in PCT test result representing a higher risk for 28-day all-cause mortality of patients diagnosed with severe sepsis or septic shock.    Change in PCT >80 %   A decrease of PCT levels of more than 80% defines a negative change in PCT result representing a lower risk for 28-day all-cause mortality of patients diagnosed with severe sepsis or septic shock.                Basic Metabolic Panel [[id number]]  (Abnormal) Collected: 07/12/21 0426    Specimen: Blood Updated: 07/12/21 0548     Glucose 224 mg/dL      BUN 26 mg/dL      Creatinine 1.23 mg/dL      Sodium 136 mmol/L      Potassium 4.8 mmol/L      Chloride 99 mmol/L      CO2 27.0 mmol/L      Calcium 8.4 mg/dL      eGFR Non African Amer 57 mL/min/1.73      BUN/Creatinine Ratio 21.1     Anion Gap 10.0 mmol/L     Narrative:      GFR Normal >60  Chronic Kidney Disease <60  Kidney Failure <15      Magnesium [[id number]]  (Normal) Collected: 07/12/21 0426    Specimen: Blood Updated: 07/12/21 0542     Magnesium 2.2 mg/dL     Digoxin Level [[id number]]  (Normal) Collected: 07/12/21 0426    Specimen: Blood Updated: " "07/12/21 0542     Digoxin 0.70 ng/mL     CBC (No Diff) [[id number]]  (Abnormal) Collected: 07/12/21 0426    Specimen: Blood Updated: 07/12/21 0527     WBC 15.45 10*3/mm3      RBC 3.19 10*6/mm3      Hemoglobin 10.2 g/dL      Hematocrit 32.5 %      .9 fL      MCH 32.0 pg      MCHC 31.4 g/dL      RDW 15.5 %      RDW-SD 57.1 fl      MPV 11.1 fL      Platelets 187 10*3/mm3     Hemoglobin A1c [[id number]]  (Abnormal) Collected: 07/11/21 0329    Specimen: Blood Updated: 07/11/21 1334     Hemoglobin A1C 7.80 %     Narrative:      Hemoglobin A1C Ranges:    Increased Risk for Diabetes  5.7% to 6.4%  Diabetes                     >= 6.5%  Diabetic Goal                < 7.0%    Digoxin Level [[id number]]  (Abnormal) Collected: 07/11/21 0328    Specimen: Blood Updated: 07/11/21 0423     Digoxin 1.50 ng/mL     Procalcitonin [[id number]]  (Normal) Collected: 07/11/21 0328    Specimen: Blood Updated: 07/11/21 0423     Procalcitonin 0.06 ng/mL     Narrative:      As a Marker for Sepsis (Non-Neonates):     1. <0.5 ng/mL represents a low risk of severe sepsis and/or septic shock.  2. >2 ng/mL represents a high risk of severe sepsis and/or septic shock.    As a Marker for Lower Respiratory Tract Infections that require antibiotic therapy:  PCT on Admission     Antibiotic Therapy             6-12 Hrs later  >0.5                          Strongly Recommended            >0.25 - <0.5             Recommended  0.1 - 0.25                  Discouraged                       Remeasure/reassess PCT  <0.1                         Strongly Discouraged         Remeasure/reassess PCT      As 28 day mortality risk marker: \"Change in Procalcitonin Result\" (>80% or <=80%) if Day 0 (or Day 1) and Day 4 values are available. Refer to http://www.AdXposes-pct-calculator.com/    Change in PCT <=80 %   A decrease of PCT levels below or equal to 80% defines a positive change in PCT test result representing a higher risk for 28-day all-cause mortality of " patients diagnosed with severe sepsis or septic shock.    Change in PCT >80 %   A decrease of PCT levels of more than 80% defines a negative change in PCT result representing a lower risk for 28-day all-cause mortality of patients diagnosed with severe sepsis or septic shock.                Comprehensive Metabolic Panel [[id number]]  (Abnormal) Collected: 07/11/21 0328    Specimen: Blood Updated: 07/11/21 0417     Glucose 395 mg/dL      BUN 25 mg/dL      Creatinine 1.39 mg/dL      Sodium 136 mmol/L      Potassium 5.1 mmol/L      Chloride 100 mmol/L      CO2 26.0 mmol/L      Calcium 8.4 mg/dL      Total Protein 6.4 g/dL      Albumin 3.60 g/dL      ALT (SGPT) 8 U/L      AST (SGOT) 11 U/L      Alkaline Phosphatase 55 U/L      Total Bilirubin 0.7 mg/dL      eGFR Non African Amer 50 mL/min/1.73      Globulin 2.8 gm/dL      A/G Ratio 1.3 g/dL      BUN/Creatinine Ratio 18.0     Anion Gap 10.0 mmol/L     Narrative:      GFR Normal >60  Chronic Kidney Disease <60  Kidney Failure <15      CBC & Differential [[id number]]  (Abnormal) Collected: 07/11/21 0329    Specimen: Blood Updated: 07/11/21 0356    Narrative:      The following orders were created for panel order CBC & Differential.  Procedure                               Abnormality         Status                     ---------                               -----------         ------                     CBC Auto Differential[[id number]]        Abnormal            Final result                 Please view results for these tests on the individual orders.    CBC Auto Differential [[id number]]  (Abnormal) Collected: 07/11/21 0329    Specimen: Blood Updated: 07/11/21 0356     WBC 12.54 10*3/mm3      RBC 3.45 10*6/mm3      Hemoglobin 11.1 g/dL      Hematocrit 35.2 %      .0 fL      MCH 32.2 pg      MCHC 31.5 g/dL      RDW 15.7 %      RDW-SD 57.7 fl      MPV 10.7 fL      Platelets 201 10*3/mm3      Neutrophil % 95.5 %      Lymphocyte % 2.1 %      Monocyte % 1.7 %       Eosinophil % 0.0 %      Basophil % 0.1 %      Immature Grans % 0.6 %      Neutrophils, Absolute 11.99 10*3/mm3      Lymphocytes, Absolute 0.26 10*3/mm3      Monocytes, Absolute 0.21 10*3/mm3      Eosinophils, Absolute 0.00 10*3/mm3      Basophils, Absolute 0.01 10*3/mm3      Immature Grans, Absolute 0.07 10*3/mm3      nRBC 0.0 /100 WBC     Basic Metabolic Panel [[id number]]  (Abnormal) Collected: 07/10/21 2214    Specimen: Blood Updated: 07/10/21 2238     Glucose 311 mg/dL      BUN 22 mg/dL      Creatinine 1.27 mg/dL      Sodium 136 mmol/L      Potassium 5.1 mmol/L      Chloride 100 mmol/L      CO2 25.0 mmol/L      Calcium 8.8 mg/dL      eGFR Non African Amer 55 mL/min/1.73      BUN/Creatinine Ratio 17.3     Anion Gap 11.0 mmol/L     Narrative:      GFR Normal >60  Chronic Kidney Disease <60  Kidney Failure <15      BNP [[id number]]  (Abnormal) Collected: 07/10/21 2214    Specimen: Blood Updated: 07/10/21 2237     proBNP 2,483.0 pg/mL     Narrative:      Among patients with dyspnea, NT-proBNP is highly sensitive for the detection of acute congestive heart failure. In addition NT-proBNP of <300 pg/ml effectively rules out acute congestive heart failure with 99% negative predictive value.    Results may be falsely decreased if patient taking Biotin.      Magnesium [[id number]]  (Normal) Collected: 07/10/21 2214    Specimen: Blood Updated: 07/10/21 2232     Magnesium 2.3 mg/dL     CBC & Differential [[id number]]  (Abnormal) Collected: 07/10/21 2214    Specimen: Blood Updated: 07/10/21 2220    Narrative:      The following orders were created for panel order CBC & Differential.  Procedure                               Abnormality         Status                     ---------                               -----------         ------                     CBC Auto Differential[[id number]]        Abnormal            Final result                 Please view results for these tests on the individual orders.    CBC Auto Differential  [[id number]]  (Abnormal) Collected: 07/10/21 2214    Specimen: Blood Updated: 07/10/21 2220     WBC 10.23 10*3/mm3      RBC 3.65 10*6/mm3      Hemoglobin 11.6 g/dL      Hematocrit 37.3 %      .2 fL      MCH 31.8 pg      MCHC 31.1 g/dL      RDW 15.7 %      RDW-SD 57.7 fl      MPV 10.5 fL      Platelets 227 10*3/mm3      Neutrophil % 95.3 %      Lymphocyte % 2.8 %      Monocyte % 1.0 %      Eosinophil % 0.0 %      Basophil % 0.2 %      Immature Grans % 0.7 %      Neutrophils, Absolute 9.75 10*3/mm3      Lymphocytes, Absolute 0.29 10*3/mm3      Monocytes, Absolute 0.10 10*3/mm3      Eosinophils, Absolute 0.00 10*3/mm3      Basophils, Absolute 0.02 10*3/mm3      Immature Grans, Absolute 0.07 10*3/mm3      nRBC 0.0 /100 WBC     Respiratory Panel, PCR (WITHOUT COVID) - Swab, Nasopharynx [[id number]]  (Normal) Collected: 07/10/21 1806    Specimen: Swab from Nasopharynx Updated: 07/10/21 1931     ADENOVIRUS, PCR Not Detected     Coronavirus 229E Not Detected     Coronavirus HKU1 Not Detected     Coronavirus NL63 Not Detected     Coronavirus OC43 Not Detected     Human Metapneumovirus Not Detected     Human Rhinovirus/Enterovirus Not Detected     Influenza B PCR Not Detected     Parainfluenza Virus 1 Not Detected     Parainfluenza Virus 2 Not Detected     Parainfluenza Virus 3 Not Detected     Parainfluenza Virus 4 Not Detected     Bordetella pertussis pcr Not Detected     Chlamydophila pneumoniae PCR Not Detected     Mycoplasma pneumo by PCR Not Detected     Influenza A PCR Not Detected     RSV, PCR Not Detected     Bordetella parapertussis PCR Not Detected    Narrative:      The coronavirus on the RVP is NOT COVID-19 and is NOT indicative of infection with COVID-19.    In the setting of a positive respiratory panel with a viral infection PLUS a negative procalcitonin without other underlying concern for bacterial infection, consider observing off antibiotics or discontinuation of antibiotics and continue supportive  care. If the respiratory panel is positive for atypical bacterial infection (Bordetella pertussis, Chlamydophila pneumoniae, or Mycoplasma pneumoniae), consider antibiotic de-escalation to target atypical bacterial infection.        Chief Complaint on Day of Discharge: Overall, patient reports feeling well.  States breathing has much improved.  He feels the best he has felt in over 2 weeks.  Encourage use of incentive spirometry.  He is tolerating a diet.  Denies nausea, vomiting or abdominal pain. He is hemodynamically stable.  He is eager for discharge home.     Hospital Course:  The patient is a 75 y.o. male who presented to [facility] as a transfer from [facility] on 7/10 for hypoxemia, pericardial effusion, and atrial fibrillation with rapid ventricular response.  He has a past medical history significant for atrial fibrillation, chronically anticoagulated on Xarelto, essential hypertension, hyperlipidemia and peripheral vascular disease.  He follows with Dr. Wynn for his primary care.  He initially presented for weakness and shortness of breath.  Denies having any chest pain or chest discomfort.  Denies palpitations, lightheadedness, dizziness, syncope.  Denies recent fever or chills.  He does have some chronic lower extremity edema. Upon evaluation at [facility] emergency department, he was noted to be hypoxic in the 80s.  He had improvement in SPO2 with 3 L nasal cannula.  He was started on IV Solu-Medrol.  Of note, he was recently diagnosed with pericardial effusion and an upcoming appointment with a specialist in [place].  He was given IV digoxin injection 500 mcg for atrial fibrillation with rapid ventricular response in addition to 500 cc bolus.  Digoxin level was supratherapeutic and home dose was held on 7/11.  He was admitted to the hospitalist service for further evaluation and management.    Respiratory PCR panel negative.  He was continued on IV Solu-Medrol with taper  "at time of discharge.  He was continued on Atrovent nebulizer treatment 4 times daily in addition to mucolytic.  He does have a nebulizer machine and breathing treatments that he can take on an as-needed basis as outpatient.  He has received 3 days of IV ceftriaxone and IV doxycycline, will go ahead and transition to oral to complete a total of 7 days antibiotic therapy.  Supplemental oxygen has been weaned to room air.  Walking oximetry today qualifies patient for 3 L oxygen continuously and with exertion.  He was seen in consultation by cardiology for atrial fibrillation with rapid ventricular response.  Dose of digoxin resumed on 7/12 in addition to Lopressor 50 mg twice daily.  He has remained rate controlled atrial flutter.  Transthoracic echocardiogram showed preserved ejection fraction.  Moderate 1-2 cm pericardial effusion without echocardiographic evidence for tamponade.  He denies chest pain or pressure.  He was continued on coagulation with Xarelto which is a new medication.  Further cardiac work-up recommended during hospitalization.  He is to follow-up with Dr. Wynn at time of discharge.  Hemoglobin A1c 7.8.  Previous hemoglobin A1c in September 2018 was 6.0.  Reports in the past he was on medicine for type 2 diabetes and had a glucometer at one time.  However, states that was \"years ago.\"  Diabetes educator consulted.  He was monitored closely on moderate sliding scale insulin.  Recommend to start patient on metformin 500 twice daily with increase to 1000 mg twice daily as outpatient. DVT prophylaxis achieved by virtue being chronically anticoagulated on Xarelto for history of atrial fibrillation.  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  Recommend to paint wounds with Betadine twice daily.  Elevate bilateral lower extremities.  Elevate heels off the bed.  Recommend outpatient vascular surgery consultation.  Strongly encouraged tobacco " "cessation.  Offer nicotine patch.  [facility] protocol with as needed medication.  States he would previously drink bourbon but has not had a drink in over a month.  Overall, patient reports feeling well.  States breathing has much improved.  He feels the best he has felt in over 2 weeks.  Encourage use of incentive spirometry.  He is tolerating a diet.  Denies nausea, vomiting or abdominal pain. He has been up ambulating without difficulty.  He is hemodynamically stable.  He is appropriate for discharge home.  He is to follow up with Dr. Wynn in one week.    Condition on Discharge:  Medically stable.    Physical Exam on Discharge:  /83 (BP Location: Left arm, Patient Position: Sitting)   Pulse 86   Temp 97.8 °F (36.6 °C) (Oral)   Resp 16   Ht 185.4 cm (72.99\")   Wt 124 kg (274 lb)   SpO2 95%   BMI 36.16 kg/m²   Physical Exam   Vitals reviewed.   Constitutional:       General: He is not in acute distress.     Appearance: He is not toxic-appearing.      Interventions: Nasal cannula in place.      Comments: Sitting on the edge of the bed.  No acute distress. On 3 L nasal cannula.   HENT:      Head: Normocephalic and atraumatic.      Mouth/Throat:      Mouth: Mucous membranes are moist.      Pharynx: Oropharynx is clear.   Eyes:      Extraocular Movements: Extraocular movements intact.      Conjunctiva/sclera: Conjunctivae normal.      Pupils: Pupils are equal, round, and reactive to light.   Cardiovascular:      Rate and Rhythm: Normal rate and regular rhythm.      Pulses: Normal pulses.      Comments: Atrial flutter rate controlled  Pulmonary:      Effort: Pulmonary effort is normal. No respiratory distress.      Breath sounds: Normal breath sounds. No wheezing.   Abdominal:      General: Bowel sounds are normal. There is no distension.      Palpations: Abdomen is soft.      Tenderness: There is no abdominal tenderness.   Musculoskeletal:         General: No swelling or tenderness. Normal range of motion.    "   Cervical back: Normal range of motion and neck supple. No muscular tenderness.   Skin:     General: Skin is warm and dry.      Findings: No erythema or rash.     Comments: Arterial ulcerations of toes of right 2nd and 3rd toes and right great toe  Neurological:      General: No focal deficit present.      Mental Status: He is alert and oriented to person, place, and time.      Cranial Nerves: No cranial nerve deficit.      Motor: No weakness.   Psychiatric:         Mood and Affect: Mood normal.         Behavior: Behavior normal.     Discharge Disposition:  Home or Self Care    Discharge Medications:     Discharge Medications      New Medications      Instructions Start Date   cefdinir 300 MG capsule  Commonly known as: OMNICEF   300 mg, Oral, 2 Times Daily      doxycycline 100 MG capsule  Commonly known as: VIBRAMYCIN   100 mg, Oral, 2 Times Daily      guaiFENesin 600 MG 12 hr tablet  Commonly known as: MUCINEX   1,200 mg, Oral, Every 12 Hours Scheduled      metFORMIN 500 MG tablet  Commonly known as: Glucophage   500 mg, Oral, 2 Times Daily With Meals      nicotine 21 MG/24HR patch  Commonly known as: NICODERM CQ   1 patch, Transdermal, Every 24 Hours Scheduled   Start Date: July 14, 2021     predniSONE 10 MG tablet  Commonly known as: DELTASONE   Take 4 tablets by mouth Daily for 3 days, THEN 3 tablets Daily for 3 days, THEN 2 tablets Daily for 3 days, THEN 1 tablet Daily for 3 days.   Start Date: July 13, 2021        Continue These Medications      Instructions Start Date   atorvastatin 40 MG tablet  Commonly known as: LIPITOR   40 mg, Oral      digoxin 125 MCG tablet  Commonly known as: LANOXIN   125 mcg, Oral, Nightly      enalapril 5 MG tablet  Commonly known as: VASOTEC   5 mg, Oral, Nightly      furosemide 20 MG tablet  Commonly known as: LASIX   20 mg, Oral, Daily      metoprolol tartrate 50 MG tablet  Commonly known as: LOPRESSOR   50 mg, 2 Times Daily      tamsulosin 0.4 MG capsule 24 hr  capsule  Commonly known as: FLOMAX   0.4 mg, Oral, Daily      tiotropium bromide-olodaterol 2.5-2.5 MCG/ACT aerosol solution inhaler  Commonly known as: STIOLTO RESPIMAT   Inhalation, Daily - RT      venlafaxine XR 75 MG 24 hr capsule  Commonly known as: EFFEXOR-XR   75 mg, Oral, Daily      Xarelto 20 MG tablet  Generic drug: rivaroxaban   20 mg, Oral      zolpidem 10 MG tablet  Commonly known as: AMBIEN   10 mg, Oral, Nightly PRN             Discharge Diet:   Diet Instructions     Diet: Regular, Cardiac, Consistent Carbohydrate      Discharge Diet:  Regular  Cardiac  Consistent Carbohydrate             Activity at Discharge:   Activity Instructions     Activity as Tolerated            Discharge Care Plan/Instructions:   1.  Follow up with Dr. Wynn in one week.  2.  Continue prednisone until completion.  3.  Continue doxycyline and ceftriaxone until completion.  4.  Metformin 5 mg twice daily.  5.  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  Recommend to paint wounds with Betadine twice daily.  Elevate bilateral lower extremities.  Elevate heels off the bed.  Recommend outpatient vascular surgery consultation.    6.  Seek evaluation for worsening symptoms.    Test Results Pending at Discharge: None.    Electronically signed by BOSTON Jimenes, 07/13/21, 10:53 CDT.    Time: 35 minutes.          Electronically signed by Cristobal Burch MD at 07/13/21 1931

## 2021-07-14 NOTE — OUTREACH NOTE
Prep Survey      Responses   [facility] from?  [facility] [place]   Is LACE score < 7 ?  No   Emergency Room discharge w/ pulse ox?  No   Eligibility  Readm Mgmt   Discharge diagnosis  COPD (chronic obstructive pulmonary disease   Does the patient have one of the following disease processes/diagnoses(primary or secondary)?  COPD/Pneumonia   Does the patient have Home health ordered?  No   Is there a DME ordered?  Yes   What DME was ordered?  Oxygen per [facility]    Prep survey completed?  Yes          Shirley Tejeda RN

## 2021-07-19 ENCOUNTER — READMISSION MANAGEMENT (OUTPATIENT)
Dept: CALL CENTER | Facility: HOSPITAL | Age: 76
End: 2021-07-19

## 2021-07-19 NOTE — OUTREACH NOTE
COPD/PN Week 1 Survey      Responses   [facility] patient discharged from?  [place]   Does the patient have one of the following disease processes/diagnoses(primary or secondary)?  COPD/Pneumonia   Was the primary reason for admission:  COPD exacerbation   Week 1 attempt successful?  No   Unsuccessful attempts  Attempt 1          Destiny Arora RN

## 2021-07-22 ENCOUNTER — READMISSION MANAGEMENT (OUTPATIENT)
Dept: CALL CENTER | Facility: HOSPITAL | Age: 76
End: 2021-07-22

## 2021-07-22 NOTE — OUTREACH NOTE
COPD/PN Week 1 Survey      Responses   [facility] patient discharged from?  [place]   Does the patient have one of the following disease processes/diagnoses(primary or secondary)?  COPD/Pneumonia   Was the primary reason for admission:  COPD exacerbation   Week 1 attempt successful?  No   Unsuccessful attempts  Attempt 2   Discharge diagnosis  COPD (chronic obstructive pulmonary disease          Argelia Toure, RN

## 2021-07-27 ENCOUNTER — READMISSION MANAGEMENT (OUTPATIENT)
Dept: CALL CENTER | Facility: HOSPITAL | Age: 76
End: 2021-07-27

## 2021-07-27 NOTE — OUTREACH NOTE
COPD/PN Week 3 Survey      Responses   [facility] patient discharged from?  [place]   Does the patient have one of the following disease processes/diagnoses(primary or secondary)?  COPD/Pneumonia   Was the primary reason for admission:  COPD exacerbation   Week 3 attempt successful?  No   Unsuccessful attempts  Attempt 1          Ade Jean, RN

## 2021-08-06 ENCOUNTER — READMISSION MANAGEMENT (OUTPATIENT)
Dept: CALL CENTER | Facility: HOSPITAL | Age: 76
End: 2021-08-06

## 2021-08-06 NOTE — OUTREACH NOTE
COPD/PN Week 4 Survey      Responses   [facility] patient discharged from?  [place]   Does the patient have one of the following disease processes/diagnoses(primary or secondary)?  COPD/Pneumonia   Was the primary reason for admission:  COPD exacerbation   Week 4 attempt successful?  No          Coretta Kebede, RN

## (undated) DEVICE — GLV SURG SENSICARE POLYISPRN W/ALOE PF LF 6 GRN STRL

## (undated) DEVICE — PK POD 60

## (undated) DEVICE — CONTAINER,SPECIMEN,OR STERILE,4OZ: Brand: MEDLINE

## (undated) DEVICE — DRAPE,T,LIMB,BILATERAL,STERILE: Brand: MEDLINE

## (undated) DEVICE — PAD GRND E/S W/CORD SPLT A/

## (undated) DEVICE — PRECISION THIN (5.5 X 0.38 X 18.0MM)

## (undated) DEVICE — GOWN,AURORA,NOREINF,RAGLAN,XL,STERILE: Brand: MEDLINE

## (undated) DEVICE — SPNG GZ WOVN 4X4IN 12PLY 10/BX STRL

## (undated) DEVICE — GLV SURG TRIUMPH LT PF LTX 6.5 STRL

## (undated) DEVICE — DISPOSABLE TOURNIQUET CUFF SINGLE BLADDER, DUAL PORT AND QUICK CONNECT CONNECTOR: Brand: COLOR CUFF

## (undated) DEVICE — STERILE POLYISOPRENE POWDER-FREE SURGICAL GLOVES WITH EMOLLIENT COATING: Brand: PROTEXIS

## (undated) DEVICE — 3M™ STERI-STRIP™ REINFORCED ADHESIVE SKIN CLOSURES, R1547, 1/2 IN X 4 IN (12 MM X 100 MM), 6 STRIPS/ENVELOPE: Brand: 3M™ STERI-STRIP™

## (undated) DEVICE — SUT ETHLN 4/0 FS2 18IN 662G

## (undated) DEVICE — BANDAGE,GAUZE,BULKEE II,4.5"X4.1YD,STRL: Brand: MEDLINE

## (undated) DEVICE — CANNULATED SCREWDRIVER

## (undated) DEVICE — CVR FLUOROSCOPE C/ARM W/TP 36X28IN

## (undated) DEVICE — PRECISION THIN (9.0 X 0.38 X 31.0MM)

## (undated) DEVICE — BLD CHISEL PROCISION SS SM 1 1/4IN

## (undated) DEVICE — STERILE POLYISOPRENE POWDER-FREE SURGICAL GLOVES: Brand: PROTEXIS

## (undated) DEVICE — NDL HYPO ECLPS SFTY 25G 1 1/2IN

## (undated) DEVICE — ADHS LIQ MASTISOL 2/3ML

## (undated) DEVICE — DRAPE,U/ SHT,SPLIT,PLAS,STERIL: Brand: MEDLINE

## (undated) DEVICE — DRSNG GZ CURAD XEROFORM NONADHS 5X9IN STRL

## (undated) DEVICE — SUT ETHLN 4/0 FS2 18IN 662H

## (undated) DEVICE — GLV SURG SENSICARE PI PF LF 7 GRN STRL

## (undated) DEVICE — UNDRPD BREATH 23X36 BG/10

## (undated) DEVICE — SOL IRR NACL 0.9PCT BT 1000ML

## (undated) DEVICE — ANTIBACTERIAL UNDYED BRAIDED (POLYGLACTIN 910), SYNTHETIC ABSORBABLE SUTURE: Brand: COATED VICRYL

## (undated) DEVICE — K-WIRE
Type: IMPLANTABLE DEVICE | Status: NON-FUNCTIONAL
Brand: ASNIS
Removed: 2020-01-15

## (undated) DEVICE — CANNULATED DRILL: Brand: ASNIS